# Patient Record
Sex: FEMALE | Race: WHITE | NOT HISPANIC OR LATINO | Employment: OTHER | URBAN - METROPOLITAN AREA
[De-identification: names, ages, dates, MRNs, and addresses within clinical notes are randomized per-mention and may not be internally consistent; named-entity substitution may affect disease eponyms.]

---

## 2020-01-14 PROBLEM — I65.23 OCCLUSION AND STENOSIS OF BILATERAL CAROTID ARTERIES: Status: ACTIVE | Noted: 2020-01-14

## 2020-01-14 PROBLEM — I77.819 DILATATION OF AORTA (HCC): Status: ACTIVE | Noted: 2020-01-14

## 2020-03-11 ENCOUNTER — HOSPITAL ENCOUNTER (INPATIENT)
Facility: MEDICAL CENTER | Age: 85
LOS: 2 days | DRG: 065 | End: 2020-03-13
Attending: EMERGENCY MEDICINE | Admitting: HOSPITALIST
Payer: MEDICARE

## 2020-03-11 ENCOUNTER — HOSPITAL ENCOUNTER (OUTPATIENT)
Dept: RADIOLOGY | Facility: MEDICAL CENTER | Age: 85
End: 2020-03-11
Payer: MEDICARE

## 2020-03-11 DIAGNOSIS — I63.10 CEREBROVASCULAR ACCIDENT (CVA) DUE TO EMBOLISM OF PRECEREBRAL ARTERY (HCC): ICD-10-CM

## 2020-03-11 DIAGNOSIS — N17.9 ACUTE KIDNEY INJURY (HCC): ICD-10-CM

## 2020-03-11 DIAGNOSIS — N39.0 ACUTE UTI: ICD-10-CM

## 2020-03-11 DIAGNOSIS — I63.9 ACUTE CVA (CEREBROVASCULAR ACCIDENT) (HCC): ICD-10-CM

## 2020-03-11 DIAGNOSIS — R09.02 HYPOXIA: ICD-10-CM

## 2020-03-11 DIAGNOSIS — I71.20 THORACIC AORTIC ANEURYSM WITHOUT RUPTURE (HCC): ICD-10-CM

## 2020-03-11 LAB
AMMONIA PLAS-SCNC: 37 UMOL/L (ref 11–45)
APTT PPP: 29.6 SEC (ref 24.7–36)
EST. AVERAGE GLUCOSE BLD GHB EST-MCNC: 192 MG/DL
HBA1C MFR BLD: 8.3 % (ref 0–5.6)
HIV 1+2 AB+HIV1 P24 AG SERPL QL IA: NORMAL
HIV 1+2 AB+HIV1 P24 AG SERPL QL IA: NORMAL
INR PPP: 1.09 (ref 0.87–1.13)
MAGNESIUM SERPL-MCNC: 1.5 MG/DL (ref 1.5–2.5)
PROTHROMBIN TIME: 14.3 SEC (ref 12–14.6)
TREPONEMA PALLIDUM IGG+IGM AB [PRESENCE] IN SERUM OR PLASMA BY IMMUNOASSAY: NORMAL
TROPONIN T SERPL-MCNC: 60 NG/L (ref 6–19)

## 2020-03-11 PROCEDURE — 770020 HCHG ROOM/CARE - TELE (206)

## 2020-03-11 PROCEDURE — 94760 N-INVAS EAR/PLS OXIMETRY 1: CPT

## 2020-03-11 PROCEDURE — 99222 1ST HOSP IP/OBS MODERATE 55: CPT | Performed by: NURSE PRACTITIONER

## 2020-03-11 PROCEDURE — 82552 ASSAY OF CPK IN BLOOD: CPT

## 2020-03-11 PROCEDURE — 84484 ASSAY OF TROPONIN QUANT: CPT | Mod: 91

## 2020-03-11 PROCEDURE — 84425 ASSAY OF VITAMIN B-1: CPT

## 2020-03-11 PROCEDURE — 99223 1ST HOSP IP/OBS HIGH 75: CPT | Performed by: HOSPITALIST

## 2020-03-11 PROCEDURE — 87389 HIV-1 AG W/HIV-1&-2 AB AG IA: CPT

## 2020-03-11 PROCEDURE — 82550 ASSAY OF CK (CPK): CPT

## 2020-03-11 PROCEDURE — 99285 EMERGENCY DEPT VISIT HI MDM: CPT

## 2020-03-11 PROCEDURE — 82140 ASSAY OF AMMONIA: CPT

## 2020-03-11 PROCEDURE — 84443 ASSAY THYROID STIM HORMONE: CPT

## 2020-03-11 PROCEDURE — G0475 HIV COMBINATION ASSAY: HCPCS

## 2020-03-11 PROCEDURE — 83036 HEMOGLOBIN GLYCOSYLATED A1C: CPT

## 2020-03-11 PROCEDURE — 700105 HCHG RX REV CODE 258: Performed by: HOSPITALIST

## 2020-03-11 PROCEDURE — 86780 TREPONEMA PALLIDUM: CPT

## 2020-03-11 PROCEDURE — 85610 PROTHROMBIN TIME: CPT

## 2020-03-11 PROCEDURE — 83735 ASSAY OF MAGNESIUM: CPT

## 2020-03-11 PROCEDURE — 82607 VITAMIN B-12: CPT

## 2020-03-11 PROCEDURE — 85730 THROMBOPLASTIN TIME PARTIAL: CPT

## 2020-03-11 RX ORDER — POLYETHYLENE GLYCOL 3350 17 G/17G
1 POWDER, FOR SOLUTION ORAL
Status: DISCONTINUED | OUTPATIENT
Start: 2020-03-11 | End: 2020-03-13 | Stop reason: HOSPADM

## 2020-03-11 RX ORDER — OXYCODONE HYDROCHLORIDE 5 MG/1
2.5 TABLET ORAL
Status: DISCONTINUED | OUTPATIENT
Start: 2020-03-11 | End: 2020-03-13 | Stop reason: HOSPADM

## 2020-03-11 RX ORDER — LABETALOL HYDROCHLORIDE 5 MG/ML
10 INJECTION, SOLUTION INTRAVENOUS EVERY 4 HOURS PRN
Status: DISCONTINUED | OUTPATIENT
Start: 2020-03-11 | End: 2020-03-13 | Stop reason: HOSPADM

## 2020-03-11 RX ORDER — CLOPIDOGREL BISULFATE 75 MG/1
75 TABLET ORAL EVERY MORNING
Status: DISCONTINUED | OUTPATIENT
Start: 2020-03-11 | End: 2020-03-13 | Stop reason: HOSPADM

## 2020-03-11 RX ORDER — GLIMEPIRIDE 4 MG/1
4 TABLET ORAL 2 TIMES DAILY
COMMUNITY
End: 2020-05-22 | Stop reason: SDUPTHER

## 2020-03-11 RX ORDER — BISACODYL 10 MG
10 SUPPOSITORY, RECTAL RECTAL
Status: DISCONTINUED | OUTPATIENT
Start: 2020-03-11 | End: 2020-03-13 | Stop reason: HOSPADM

## 2020-03-11 RX ORDER — ASPIRIN 81 MG/1
324 TABLET, CHEWABLE ORAL DAILY
Status: DISCONTINUED | OUTPATIENT
Start: 2020-03-11 | End: 2020-03-11

## 2020-03-11 RX ORDER — OXYCODONE HYDROCHLORIDE 5 MG/1
5 TABLET ORAL
Status: DISCONTINUED | OUTPATIENT
Start: 2020-03-11 | End: 2020-03-13 | Stop reason: HOSPADM

## 2020-03-11 RX ORDER — HYDRALAZINE HYDROCHLORIDE 20 MG/ML
10 INJECTION INTRAMUSCULAR; INTRAVENOUS
Status: DISCONTINUED | OUTPATIENT
Start: 2020-03-11 | End: 2020-03-13 | Stop reason: HOSPADM

## 2020-03-11 RX ORDER — ONDANSETRON 4 MG/1
4 TABLET, ORALLY DISINTEGRATING ORAL EVERY 4 HOURS PRN
Status: DISCONTINUED | OUTPATIENT
Start: 2020-03-11 | End: 2020-03-13 | Stop reason: HOSPADM

## 2020-03-11 RX ORDER — ATORVASTATIN CALCIUM 20 MG/1
80 TABLET, FILM COATED ORAL EVERY MORNING
Status: DISCONTINUED | OUTPATIENT
Start: 2020-03-11 | End: 2020-03-13 | Stop reason: HOSPADM

## 2020-03-11 RX ORDER — ASPIRIN 300 MG/1
300 SUPPOSITORY RECTAL DAILY
Status: DISCONTINUED | OUTPATIENT
Start: 2020-03-11 | End: 2020-03-11

## 2020-03-11 RX ORDER — ACETAMINOPHEN 325 MG/1
650 TABLET ORAL EVERY 6 HOURS PRN
Status: DISCONTINUED | OUTPATIENT
Start: 2020-03-11 | End: 2020-03-13 | Stop reason: HOSPADM

## 2020-03-11 RX ORDER — ASPIRIN 325 MG
325 TABLET ORAL DAILY
Status: DISCONTINUED | OUTPATIENT
Start: 2020-03-11 | End: 2020-03-11

## 2020-03-11 RX ORDER — DEXTROSE AND SODIUM CHLORIDE 5; .45 G/100ML; G/100ML
INJECTION, SOLUTION INTRAVENOUS CONTINUOUS
Status: DISCONTINUED | OUTPATIENT
Start: 2020-03-11 | End: 2020-03-12

## 2020-03-11 RX ORDER — ATORVASTATIN CALCIUM 20 MG/1
20 TABLET, FILM COATED ORAL EVERY MORNING
Status: ON HOLD | COMMUNITY
End: 2020-03-13

## 2020-03-11 RX ORDER — PROPRANOLOL HYDROCHLORIDE 80 MG/1
80 TABLET ORAL 2 TIMES DAILY
COMMUNITY
End: 2020-09-09 | Stop reason: SDUPTHER

## 2020-03-11 RX ORDER — MORPHINE SULFATE 4 MG/ML
2 INJECTION, SOLUTION INTRAMUSCULAR; INTRAVENOUS
Status: DISCONTINUED | OUTPATIENT
Start: 2020-03-11 | End: 2020-03-13 | Stop reason: HOSPADM

## 2020-03-11 RX ORDER — OMEPRAZOLE 20 MG/1
20 CAPSULE, DELAYED RELEASE ORAL DAILY
Status: DISCONTINUED | OUTPATIENT
Start: 2020-03-11 | End: 2020-03-13 | Stop reason: HOSPADM

## 2020-03-11 RX ORDER — AMOXICILLIN 250 MG
2 CAPSULE ORAL 2 TIMES DAILY
Status: DISCONTINUED | OUTPATIENT
Start: 2020-03-11 | End: 2020-03-13 | Stop reason: HOSPADM

## 2020-03-11 RX ORDER — INSULIN GLARGINE 100 [IU]/ML
10 INJECTION, SOLUTION SUBCUTANEOUS EVERY EVENING
COMMUNITY
End: 2020-09-09 | Stop reason: SDUPTHER

## 2020-03-11 RX ORDER — CLOPIDOGREL BISULFATE 75 MG/1
75 TABLET ORAL EVERY MORNING
COMMUNITY
End: 2020-07-31

## 2020-03-11 RX ORDER — ENALAPRIL MALEATE 10 MG/1
10 TABLET ORAL 2 TIMES DAILY
COMMUNITY
End: 2020-05-08

## 2020-03-11 RX ORDER — ONDANSETRON 2 MG/ML
4 INJECTION INTRAMUSCULAR; INTRAVENOUS EVERY 4 HOURS PRN
Status: DISCONTINUED | OUTPATIENT
Start: 2020-03-11 | End: 2020-03-13 | Stop reason: HOSPADM

## 2020-03-11 RX ADMIN — DEXTROSE AND SODIUM CHLORIDE: 5; 450 INJECTION, SOLUTION INTRAVENOUS at 21:08

## 2020-03-11 ASSESSMENT — ENCOUNTER SYMPTOMS
VOMITING: 0
HEARTBURN: 0
DOUBLE VISION: 0
EYES NEGATIVE: 1
CHILLS: 0
FOCAL WEAKNESS: 0
HEMOPTYSIS: 0
DIARRHEA: 0
MUSCULOSKELETAL NEGATIVE: 1
WEAKNESS: 1
BLOOD IN STOOL: 0
GASTROINTESTINAL NEGATIVE: 1
MEMORY LOSS: 1
LOSS OF CONSCIOUSNESS: 0
FEVER: 0
CARDIOVASCULAR NEGATIVE: 1
CONSTITUTIONAL NEGATIVE: 1
PALPITATIONS: 0
SEIZURES: 0
HEADACHES: 0
NERVOUS/ANXIOUS: 0
SENSORY CHANGE: 1
DIZZINESS: 0
ABDOMINAL PAIN: 0
CONSTIPATION: 0
BRUISES/BLEEDS EASILY: 0
COUGH: 0
NAUSEA: 0
DIAPHORESIS: 0
WHEEZING: 0
RESPIRATORY NEGATIVE: 1

## 2020-03-11 ASSESSMENT — COPD QUESTIONNAIRES
DO YOU EVER COUGH UP ANY MUCUS OR PHLEGM?: NO/ONLY WITH OCCASIONAL COLDS OR INFECTIONS
DURING THE PAST 4 WEEKS HOW MUCH DID YOU FEEL SHORT OF BREATH: NONE/LITTLE OF THE TIME
COPD SCREENING SCORE: 4
HAVE YOU SMOKED AT LEAST 100 CIGARETTES IN YOUR ENTIRE LIFE: YES

## 2020-03-11 ASSESSMENT — FIBROSIS 4 INDEX: FIB4 SCORE: 2.22

## 2020-03-11 ASSESSMENT — PATIENT HEALTH QUESTIONNAIRE - PHQ9
1. LITTLE INTEREST OR PLEASURE IN DOING THINGS: NOT AT ALL
SUM OF ALL RESPONSES TO PHQ9 QUESTIONS 1 AND 2: 0
2. FEELING DOWN, DEPRESSED, IRRITABLE, OR HOPELESS: NOT AT ALL

## 2020-03-11 ASSESSMENT — LIFESTYLE VARIABLES: EVER_SMOKED: YES

## 2020-03-11 NOTE — ED PROVIDER NOTES
ED Provider Note    Scribed for Charity Glover M.D. by Gold Avendano. 3/11/2020, 4:18 PM.    Primary care provider: Robert Arteaga M.D.  Means of arrival: Transfer  History obtained from: Patient and Transfer Documents  History limited by: None    CHIEF COMPLAINT  Chief Complaint   Patient presents with   • Possible Stroke     Seen at OSH, + CVA on MRI, improving S/Sx       HPI  Darleen Verdugo is a 85 y.o. female who presents to the Emergency Department as a [place] [facility] [place] evaluation of a possible stroke. She reported to the facility via EMS for evaluation of altered mental status after her significant other was unable to arouse her in the morning. He noted that she went to bed at night appearing normal and oriented. En route to the ED the patient became more aware and was A&Ox3 but drowsy upon arrival to the er. She has a history of diabetes and EMS reports a sugar of 170 en route. Hypoxic at that time so placed on supplemental oxygen. The patient is currently being treated for a UTI, she was given Bactrim. UA at [facility] revealed evidence of the UTI so was given 2g of Rocephin. Additional to this the Urine drug screen was returned positive for Benzodiazepines, but has no history of taking these medications. CT completed ruling out PE, but showed an enlargement of a known thoracic aneurism from 4.2 to 5.9. MRI completed showing multifactorial acute infarcts that are thought to be secondary to an embolic phenomenon. Dr. Ross from Neuro was contacted and he suspects cardioembolic stroke possibly from A fib, but no A fib in ED. Her creatinine was 2.1. Transferred because no neuro at [facility].      The patient states that she does not remember what happened today, but denies confusion. Denies having a headache, numbness or weakness in her extremities, chest pain, fevers or abdominal pain. The patient denies history of lung problems or cardiac problems. The patient remembers  "that EMS transferred her to the hospital. States that she is currently taking blood thinners but is unsure what medication.     REVIEW OF SYSTEMS  Pertinent positives include confusion(resolved) and drowsyness. Pertinent negatives include no headache or numbness/weakness in extremities, chest pain, fever, abdominal pain. All other systems negative.    PAST MEDICAL HISTORY   has a past medical history of Aortic aneurysm, thoracic ([facility]) (10/15/2012), Arthritis, Carotid stenosis, right (10/15/2012), CATARACT, COPD (chronic obstructive pulmonary disease) ([facility]) (10/15/2012), Diabetes, Diverticulosis (10/15/2012), Eczema, Essential tremor (10/15/2012), GERD (gastroesophageal reflux disease), Glaucoma, H/O hyperkalemia (10/15/2012), Hyperlipidemia, Hypertension, Nocturnal hypoxia (10/15/2012), Rosacea, Stroke ([facility]), and Vertigo.    SURGICAL HISTORY   has a past surgical history that includes rotator cuff repair; foot surgery; abdominal hysterectomy total; and cataract extraction with iol.    SOCIAL HISTORY  Social History     Tobacco Use   • Smoking status: Former Smoker     Packs/day: 0.75     Years: 50.00     Pack years: 37.50     Types: Cigarettes     Last attempt to quit: 10/20/2003     Years since quittin.4   • Smokeless tobacco: Never Used   Substance Use Topics   • Alcohol use: Yes     Comment: rarely   • Drug use: No      Social History     Substance and Sexual Activity   Drug Use No       FAMILY HISTORY  Family History   Problem Relation Age of Onset   • Hypertension Mother    • Cancer Mother         lymphoma   • Cancer Father         lung   • Cancer Sister    • Heart Disease Son        CURRENT MEDICATIONS  Home Medications    **Home medications have not yet been reviewed for this encounter**         ALLERGIES  No Known Allergies    PHYSICAL EXAM  VITAL SIGNS: /77   Pulse 66   Temp 36.8 °C (98.2 °F) (Temporal)   Resp 20   Ht 1.626 m (5' 4\")   Wt 80.2 kg (176 lb 11.2 oz)   SpO2 (!) 87%   BMI 30.33 " kg/m²     Constitutional: Hard of hearing so difficult to tell if she is slow answering questions due to that or another cause, Well developed, No acute distress, Non-toxic appearance.   HENT: Normocephalic, Atraumatic, Bilateral external ears normal, Oropharynx mois  Eyes: PERRL, EOMI, Conjunctiva normal  Neck: Normal range of motion, No tenderness, Supple  Cardiovascular: Normal heart rate, Normal rhythm,   Thorax & Lungs: Normal breath sounds, No respiratory distress,   Abdomen: Benign abdominal exam, no guarding no rebound, no tenderness, no distention  Skin: Warm, Dry, No erythema, No rash.   Back: No tenderness, No CVA tenderness.   Extremities: Intact distal pulses, No edema, No tenderness   Neurologic: Alert & oriented x 3. Cranial nerves II through XII intact. No facial asymmetry. No slurred speech, No aphasia, Good strength bilaterally. No pronator drift.  +5 strength in upper and lower extremities and intact sensation to light touch throughout.  Psychiatric: Appropriate                                                     DIAGNOSTIC STUDIES / PROCEDURES      COURSE & MEDICAL DECISION MAKING  Nursing notes, VS, PMSFHx reviewed in chart.     4:08 PM - Discussed the patient's case with the transfer provider.     4:10 PM - I have reviewed the patient's transfer documents.     4:18 PM Patient seen and examined at bedside. We discussed that the patient is to be hospitalized so she can be further evaluated by neurology. She is understanding and consenting.  Patient will require monitoring to evaluate for possible thromboembolic cause for her strokes.  Patient has received antibiotics for her urinary tract infection.  She is not having any active chest pain and there is no evidence of dissection on CT scan.  Patient is hypoxic and is unclear what the etiology is.  She did have benzodiazepines in her system but no clear history of taking benzodiazepines.  However this could be the cause of the altered mental status  and her hypoxia.  Patient is awake and alert here.  She does not have any current complaints.  The onset of these strokes is unknown and therefore she is not a candidate for TPA.    4:28 PM - I spoke to Dr. Niño (hospitalist) regarding the patient’s pertinent abnormalities. Dr. Niño agrees to evaluate the patient for hospitalization. He would not like repeated labs at this time.       DISPOSITION:  Patient will be hospitalized by Dr. Niño in guarded condition.     FINAL IMPRESSION  1. Acute CVA (cerebrovascular accident) (HCC)    2. Acute UTI    3. Hypoxia    4. Thoracic aortic aneurysm without rupture (HCC)    5. Acute kidney injury (HCC)          IGold (Scribe), am scribing for, and in the presence of, Charity Glover M.D..    Electronically signed by: Gold Avendano (Scribe), 3/11/2020    ICharity M.D. personally performed the services described in this documentation, as scribed by Gold Avendano in my presence, and it is both accurate and complete. C    The note accurately reflects work and decisions made by me.  Charity Glover M.D.  3/11/2020  5:09 PM

## 2020-03-11 NOTE — ED NOTES
Pharmacy Medication Reconciliation    Med rec updated and complete per pt at bedside & pt home pharmacy  Allergies have been verified   Pt home pharmacy:[facility]      Pt home pharmacy reports that pt is on a 7 day course of BACTRIM that was started on 02/10/2020

## 2020-03-11 NOTE — ED TRIAGE NOTES
Chief Complaint   Patient presents with   • Possible Stroke     Seen at OSH, + CVA on MRI, improving S/Sx     Patient was brought to [facility] ER for Further CVA work up. Per [facility], patient had a significnat episode of AMS this morning, was last known normal at 0800 this morning. For the Initial Crew patient was unable to participate in Neuro exam because of the AMS. Patient mentation has improved to the point that she is A&Ox 4, but had an MRI that was + for CVA

## 2020-03-12 ENCOUNTER — APPOINTMENT (OUTPATIENT)
Dept: CARDIOLOGY | Facility: MEDICAL CENTER | Age: 85
DRG: 065 | End: 2020-03-12
Attending: HOSPITALIST
Payer: MEDICARE

## 2020-03-12 LAB
CHOLEST SERPL-MCNC: 147 MG/DL (ref 100–199)
EKG IMPRESSION: NORMAL
GLUCOSE BLD-MCNC: 120 MG/DL (ref 65–99)
GLUCOSE BLD-MCNC: 130 MG/DL (ref 65–99)
GLUCOSE BLD-MCNC: 144 MG/DL (ref 65–99)
HDLC SERPL-MCNC: 35 MG/DL
LDLC SERPL CALC-MCNC: 80 MG/DL
TRIGL SERPL-MCNC: 158 MG/DL (ref 0–149)
TSH SERPL DL<=0.005 MIU/L-ACNC: 1.32 UIU/ML (ref 0.38–5.33)
VIT B12 SERPL-MCNC: 270 PG/ML (ref 211–911)

## 2020-03-12 PROCEDURE — 770020 HCHG ROOM/CARE - TELE (206)

## 2020-03-12 PROCEDURE — 93005 ELECTROCARDIOGRAM TRACING: CPT | Performed by: INTERNAL MEDICINE

## 2020-03-12 PROCEDURE — 99233 SBSQ HOSP IP/OBS HIGH 50: CPT | Performed by: INTERNAL MEDICINE

## 2020-03-12 PROCEDURE — 97166 OT EVAL MOD COMPLEX 45 MIN: CPT

## 2020-03-12 PROCEDURE — 700102 HCHG RX REV CODE 250 W/ 637 OVERRIDE(OP): Performed by: HOSPITALIST

## 2020-03-12 PROCEDURE — 92523 SPEECH SOUND LANG COMPREHEN: CPT

## 2020-03-12 PROCEDURE — 80061 LIPID PANEL: CPT

## 2020-03-12 PROCEDURE — 93010 ELECTROCARDIOGRAM REPORT: CPT | Performed by: INTERNAL MEDICINE

## 2020-03-12 PROCEDURE — 97161 PT EVAL LOW COMPLEX 20 MIN: CPT

## 2020-03-12 PROCEDURE — 99232 SBSQ HOSP IP/OBS MODERATE 35: CPT | Performed by: NURSE PRACTITIONER

## 2020-03-12 PROCEDURE — 82962 GLUCOSE BLOOD TEST: CPT | Mod: 91

## 2020-03-12 PROCEDURE — 36415 COLL VENOUS BLD VENIPUNCTURE: CPT

## 2020-03-12 PROCEDURE — A9270 NON-COVERED ITEM OR SERVICE: HCPCS | Performed by: HOSPITALIST

## 2020-03-12 RX ADMIN — SENNOSIDES AND DOCUSATE SODIUM 2 TABLET: 8.6; 5 TABLET ORAL at 09:29

## 2020-03-12 RX ADMIN — ATORVASTATIN CALCIUM 80 MG: 20 TABLET, FILM COATED ORAL at 09:29

## 2020-03-12 RX ADMIN — SENNOSIDES AND DOCUSATE SODIUM 2 TABLET: 8.6; 5 TABLET ORAL at 17:19

## 2020-03-12 RX ADMIN — CLOPIDOGREL BISULFATE 75 MG: 75 TABLET ORAL at 09:29

## 2020-03-12 RX ADMIN — OMEPRAZOLE 20 MG: 20 CAPSULE, DELAYED RELEASE ORAL at 09:29

## 2020-03-12 ASSESSMENT — ENCOUNTER SYMPTOMS
HEADACHES: 0
HEARTBURN: 0
PALPITATIONS: 0
VOMITING: 0
SHORTNESS OF BREATH: 0
FEVER: 0
SORE THROAT: 0
DEPRESSION: 0
NAUSEA: 0
BLOOD IN STOOL: 0
CONSTIPATION: 0
ABDOMINAL PAIN: 0
FOCAL WEAKNESS: 0
CHILLS: 0
DIZZINESS: 0
MYALGIAS: 0
DIARRHEA: 0
BLURRED VISION: 0
WEAKNESS: 0

## 2020-03-12 ASSESSMENT — COGNITIVE AND FUNCTIONAL STATUS - GENERAL
CLIMB 3 TO 5 STEPS WITH RAILING: A LITTLE
STANDING UP FROM CHAIR USING ARMS: A LITTLE
DRESSING REGULAR UPPER BODY CLOTHING: A LITTLE
MOVING TO AND FROM BED TO CHAIR: A LITTLE
EATING MEALS: A LITTLE
DAILY ACTIVITIY SCORE: 18
MOVING FROM LYING ON BACK TO SITTING ON SIDE OF FLAT BED: A LITTLE
TOILETING: A LITTLE
HELP NEEDED FOR BATHING: A LITTLE
DRESSING REGULAR LOWER BODY CLOTHING: A LITTLE
MOBILITY SCORE: 19
WALKING IN HOSPITAL ROOM: A LITTLE
PERSONAL GROOMING: A LITTLE
SUGGESTED CMS G CODE MODIFIER MOBILITY: CK
SUGGESTED CMS G CODE MODIFIER DAILY ACTIVITY: CK

## 2020-03-12 ASSESSMENT — GAIT ASSESSMENTS
DISTANCE (FEET): 200
GAIT LEVEL OF ASSIST: STAND BY ASSIST

## 2020-03-12 ASSESSMENT — ACTIVITIES OF DAILY LIVING (ADL): TOILETING: INDEPENDENT

## 2020-03-12 NOTE — THERAPY
"Speech Language Therapy Evaluation completed to address speech and cognition  Functional Status:  Speech Language and Cognitive evaluation completed using the bedside WAB and Cognistat. She had minimal deficits in orientation and recall with moderate to severe deficits in visual scanning and reading. Initially, she was naming pictures incorrectly \"it looks like a cushion\" then cued to scan/look all the way to the left and she realized \"oh, it's a shoe.\" This was apparent for any picture, written word or sentence that crossed midline to the left of the page. She recalled 2/4 items in 10 minutes but 4/4 when given cues. Her auditory processing was delayed but after 20-30 seconds she would repeat a 6 digit sequence or sentence with 100% accuracy. She reports that the visual deficit regarding not seeing the left side of the page is new. She also wears reading glasses bus does not have them here and declined to try any of the pairs that we have. She reports she had right sided visual difficulties with her previous stroke but this improved. Please remind patient to scan and look to the left when she is trying to find items in her room  Recommendations:  She will likely need assistance with ADLs/IADLs like meal prep, medication management given her visual deficits. Defer to OT/PT for functional assessment and safety of discharge.   Plan of Care: Will benefit from Speech Therapy 3 times per week while in the hospital.  Post-Acute Therapy: Recommend outpatient or home health transitional care services for continued speech therapy services      See \"Rehab Therapy-Acute\" Patient Summary Report for complete documentation.   "

## 2020-03-12 NOTE — PROGRESS NOTES
HOSPITAL MEDICINE PROGRESS NOTE    Date of service  3/12/2020    Chief Complaint  Possible Stroke (Seen at OSH, + CVA on MRI, improving S/Sx)      Hospital Course:     85-year-old woman who presented to the hospital on transfer from an outside facility for possible stroke.  MRI of the brain here show multiple lesions acutely in different territory, suspicious for embolic phenomenon from a cardiac source, most likely secondary to alcohol atrial fibrillation, though EKG and telemetry have shown persistent normal sinus rhythm.           Interval History Updates:  No event overnight.  Afebrile.      Consultants/Specialty  Neurology    Review of Systems   Review of Systems   Constitutional: Negative for chills and fever.   HENT: Negative for ear pain and sore throat.    Eyes: Negative for blurred vision.   Respiratory: Negative for shortness of breath.    Cardiovascular: Negative for chest pain, palpitations and leg swelling.   Gastrointestinal: Negative for abdominal pain, blood in stool, constipation, diarrhea, heartburn, melena, nausea and vomiting.   Genitourinary: Negative for dysuria, hematuria and urgency.   Musculoskeletal: Negative for myalgias.   Skin: Negative for rash.   Neurological: Negative for dizziness, focal weakness, weakness and headaches.   Endo/Heme/Allergies: Negative.    Psychiatric/Behavioral: Negative for depression.   All other systems reviewed and are negative.       Medications:  • atorvastatin  80 mg QAM   • clopidogrel  75 mg QAM   • omeprazole  20 mg DAILY   • acetaminophen  650 mg Q6HRS PRN   • ondansetron  4 mg Q4HRS PRN   • ondansetron  4 mg Q4HRS PRN   • senna-docusate  2 Tab BID    And   • polyethylene glycol/lytes  1 Packet QDAY PRN    And   • magnesium hydroxide  30 mL QDAY PRN    And   • bisacodyl  10 mg QDAY PRN   • Pharmacy   PHARMACY TO DOSE   • Respiratory Therapy Consult   Continuous RT   • Pharmacy Consult Request  1 Each PHARMACY TO DOSE    And   • oxyCODONE  immediate-release  2.5 mg Q3HRS PRN    And   • oxyCODONE immediate-release  5 mg Q3HRS PRN    And   • morphine injection  2 mg Q3HRS PRN   • labetalol  10 mg Q4HRS PRN    Or   • hydrALAZINE  10 mg Q2HRS PRN       Physical Exam   Vitals:    03/11/20 2116 03/12/20 0000 03/12/20 0400 03/12/20 0800   BP:  112/48 101/81 125/75   Pulse: 73 64 91 97   Resp: 16 16 18 16   Temp:  36.7 °C (98.1 °F) 37 °C (98.6 °F) 36.4 °C (97.6 °F)   TempSrc:  Temporal Temporal Temporal   SpO2: 97% 98% 95% 98%   Weight:       Height:           Physical Exam   Constitutional: She is oriented to person, place, and time and well-developed, well-nourished, and in no distress. No distress.   HENT:   Head: Normocephalic and atraumatic.   Eyes: Pupils are equal, round, and reactive to light. Conjunctivae are normal. No scleral icterus.   Neck: Normal range of motion. Neck supple.   Cardiovascular: Normal rate, regular rhythm and intact distal pulses. Exam reveals no gallop and no friction rub.   No murmur heard.  Pulmonary/Chest: Effort normal and breath sounds normal. No respiratory distress. She has no wheezes. She has no rales. She exhibits no tenderness.   Abdominal: Soft. Bowel sounds are normal. She exhibits no distension and no mass. There is no abdominal tenderness. There is no rebound and no guarding.   Musculoskeletal: Normal range of motion.         General: No tenderness or edema.   Neurological: She is alert and oriented to person, place, and time.   Skin: Skin is warm and dry. She is not diaphoretic.   Psychiatric: Mood and affect normal.   Nursing note and vitals reviewed.         Laboratory   Recent Labs     03/11/20  0950   WBC 7.7   RBC 3.80*   HEMOGLOBIN 11.8*   HEMATOCRIT 35.6*   PLATELETCT 166     Recent Labs     03/11/20  0950   SODIUM 138   POTASSIUM 4.3   CHLORIDE 104   CO2 22   GLUCOSE 133*   BUN 37*   CREATININE 2.1*      Recent Labs     03/11/20  0950   ALTSGPT 12   ASTSGOT 15   ALKPHOSPHAT 71   TBILIRUBIN 0.6   LIPASE 203    GLUCOSE 133*      Recent Labs     03/11/20  1605   APTT 29.6   INR 1.09           Microbiology  Results     ** No results found for the last 168 hours. **             Labs reviewed by me and noted above.    Radiology  MR-BRAIN-W/O  Impression: Multifocal acute infarcts as above. May be secondary to embolic phenomenon versus represent watershed infarcts.    Moderate to severe chronic small vessel ischemic changes with old left occipital lobe and left basal ganglial infarcts.    CT-HEAD W/O  Impression: Degenerative changes are present, along with a remote infarction involving the left occipital lobe.  However, I do not appreciate any evidence of an acute brain injury, or brain hemorrhage.    EKG performed 3/12/20 was personally reviewed and per my interpretation shows NSR, rate 74.  No ischemia.    CXR 3/11/20 personally reviewed and per my interpretation shows no obvious infiltrate or consolidation.      Assessment and Plan      Principal Problem:    Cerebral infarction (HCC) POA: Yes      Overview: Diagnois update 10/1/2016  Active Problems:    Carotid stenosis, right (Chronic) POA: Yes    HTN (hypertension) (Chronic) POA: Yes    T2DM (type 2 diabetes mellitus) (HCC) POA: Yes    Hyperlipidemia (Chronic) POA: Yes    GERD (gastroesophageal reflux disease) (Chronic) POA: Yes  Resolved Problems:    * No resolved hospital problems. *      I order an EKG to document her rhythm.  My interpretation is above.  EKG yesterday not available for review for unknown reason.  Cont Lipitor 80mg, Plavix 75mg.  No indication for ASA per Neuro.  Permissive HTN  Obtain Echo  PT and OT  Pt passed her SLP eval, so ordered diet      DVT prophylaxis: SCD    CODE STATUS:  FULL CODE    Disposition: Anticipate Home in 1-2 days.    Case was discussed with Primary RN, Charge Nurse, Medical SW, , and Pharmacist on IDTround and Pharmacist in addition to individual(s) mentioned above.    I have performed a physical exam and reviewed  and updated ROS as of today, 3/12/2020.  In review of yesterday's note dated, 3/11/2020, there are no changes except as documented above.      Wendi Boyd M.D.

## 2020-03-12 NOTE — H&P
Hospital Medicine History & Physical Note    Date of Service  3/11/2020    Primary Care Physician  Robert Arteaga M.D.    Consultants  Neurology    Code Status  Full code    Chief Complaint  Altered mental status    History of Presenting Illness  85 y.o. female who presented 3/11/2020 with altered mental status that came on this morning.  Her  called the paramedics and she was brought into the [place] emergency room.  The MRI was done which shows multiple embolic strokes.  The patient was sent up to [facility] for higher level of care.  Patient surprisingly is alert awake oriented x3.  She knows place time people around her.  She is looking for her kids though which she was supposed to see today.  She does not remember the events of this morning.  She does have some right-sided deficits that have been there for apparently 11 years and she is lived with those for a while.  Patient's work-up will need to be completed with an echocardiogram.  She will be placed on Plavix aspirin and statin.  The patient will need speech evaluation for swallowing.  She will need at this point PT OT evaluation as well.  She is outside of the window for any TPA.    Review of Systems  Review of Systems   Constitutional: Negative.  Negative for chills, diaphoresis and fever.   HENT: Negative.    Eyes: Negative.  Negative for double vision.   Respiratory: Negative.  Negative for cough, hemoptysis and wheezing.    Cardiovascular: Negative.  Negative for chest pain, palpitations and leg swelling.   Gastrointestinal: Negative.  Negative for abdominal pain, blood in stool, constipation, diarrhea, heartburn, nausea and vomiting.   Genitourinary: Negative.  Negative for frequency, hematuria and urgency.   Musculoskeletal: Negative.  Negative for joint pain.   Skin: Negative.  Negative for itching and rash.   Neurological: Positive for sensory change and weakness. Negative for dizziness, focal weakness, seizures, loss of consciousness  and headaches.   Endo/Heme/Allergies: Negative.  Does not bruise/bleed easily.   Psychiatric/Behavioral: Positive for memory loss. Negative for suicidal ideas. The patient is not nervous/anxious.    All other systems reviewed and are negative.      Past Medical History   has a past medical history of Aortic aneurysm, thoracic ([facility]) (10/15/2012), Arthritis, Carotid stenosis, right (10/15/2012), CATARACT, COPD (chronic obstructive pulmonary disease) ([facility]) (10/15/2012), Diabetes, Diverticulosis (10/15/2012), Eczema, Essential tremor (10/15/2012), GERD (gastroesophageal reflux disease), Glaucoma, H/O hyperkalemia (10/15/2012), Hyperlipidemia, Hypertension, Nocturnal hypoxia (10/15/2012), Rosacea, Stroke ([facility]), and Vertigo.    Surgical History   has a past surgical history that includes rotator cuff repair; foot surgery; abdominal hysterectomy total; and cataract extraction with iol.     Family History  family history includes Cancer in her father, mother, and sister; Heart Disease in her son; Hypertension in her mother.     Social History   reports that she quit smoking about 16 years ago. Her smoking use included cigarettes. She has a 37.50 pack-year smoking history. She has never used smokeless tobacco. She reports current alcohol use. She reports that she does not use drugs.    Allergies  No Known Allergies    Medications  Prior to Admission Medications   Prescriptions Last Dose Informant Patient Reported? Taking?   CRANBERRY PO 3/10/2020 at am Patient Yes No   Sig: Take 1 Tab by mouth every morning.   Cholecalciferol (VITAMIN D PO) 3/10/2020 at am Patient Yes No   Sig: Take 1 Tab by mouth every morning.   Cyanocobalamin (VITAMIN B-12 PO) 3/10/2020 at am Patient Yes Yes   Sig: Take 1 Tab by mouth every morning.   atorvastatin (LIPITOR) 20 MG Tab 3/10/2020 at am Patient's Home Pharmacy Yes Yes   Sig: Take 20 mg by mouth every morning.   clopidogrel (PLAVIX) 75 MG Tab 3/10/2020 at am Patient's Home Pharmacy Yes Yes    Sig: Take 75 mg by mouth every morning.   enalapril (VASOTEC) 10 MG Tab 3/10/2020 at pm Patient's Home Pharmacy Yes Yes   Sig: Take 10 mg by mouth 2 Times a Day.   glimepiride (AMARYL) 4 MG Tab 3/10/2020 at pm Patient's Home Pharmacy Yes Yes   Sig: Take 4 mg by mouth 2 Times a Day.   insulin glargine (LANTUS) 100 UNIT/ML Solution 3/10/2020 at pm Patient's Home Pharmacy Yes Yes   Sig: Inject 10 Units as instructed every evening.   metformin (GLUCOPHAGE) 1000 MG tablet 3/10/2020 at pm Patient's Home Pharmacy Yes Yes   Sig: Take 1,000 mg by mouth 2 Times a Day.   omeprazole (PRILOSEC) 20 MG delayed-release capsule 3/10/2020 at am Patient's Home Pharmacy Yes No   Sig: Take 20 mg by mouth every day.   propranolol (INDERAL) 80 MG Tab 3/10/2020 at pm Patient's Home Pharmacy Yes Yes   Sig: Take 80 mg by mouth 2 Times a Day.   sulfamethoxazole-trimethoprim (BACTRIM) 400-80 MG Tab 3/10/2020 at pm Patient's Home Pharmacy Yes No   Sig: Take 1 Tab by mouth 2 times a day. 7 day course      Facility-Administered Medications: None       Physical Exam  Temp:  [36.8 °C (98.2 °F)] 36.8 °C (98.2 °F)  Pulse:  [66-83] 83  Resp:  [15-20] 15  BP: ()/(47-77) 114/47  SpO2:  [87 %-100 %] 100 %    Physical Exam  Vitals signs and nursing note reviewed. Exam conducted with a chaperone present.   Constitutional:       Appearance: Normal appearance. She is well-developed. She is obese. She is ill-appearing.   HENT:      Head: Normocephalic and atraumatic.      Right Ear: External ear normal.      Left Ear: External ear normal.      Nose: Nose normal.      Mouth/Throat:      Mouth: Mucous membranes are dry.      Pharynx: Oropharynx is clear.   Eyes:      Extraocular Movements: Extraocular movements intact.      Conjunctiva/sclera: Conjunctivae normal.      Pupils: Pupils are equal, round, and reactive to light.   Neck:      Musculoskeletal: Normal range of motion and neck supple.      Thyroid: No thyromegaly.      Vascular: No JVD.    Cardiovascular:      Rate and Rhythm: Tachycardia present. Rhythm irregular.      Pulses: Normal pulses.      Heart sounds: Normal heart sounds. No murmur.   Pulmonary:      Effort: Pulmonary effort is normal.      Breath sounds: Normal breath sounds.   Chest:      Chest wall: No tenderness.   Abdominal:      General: Abdomen is flat. There is no distension.      Palpations: Abdomen is soft. There is no mass.      Tenderness: There is no abdominal tenderness. There is no guarding or rebound.   Musculoskeletal: Normal range of motion.   Lymphadenopathy:      Cervical: No cervical adenopathy.   Skin:     General: Skin is warm and dry.      Capillary Refill: Capillary refill takes 2 to 3 seconds.      Findings: No rash.   Neurological:      Mental Status: She is alert and oriented to person, place, and time. Mental status is at baseline.      GCS: GCS eye subscore is 4. GCS verbal subscore is 5. GCS motor subscore is 4.      Cranial Nerves: Cranial nerves are intact. No cranial nerve deficit.      Sensory: Sensation is intact.      Motor: Weakness, tremor and abnormal muscle tone present. No atrophy, seizure activity or pronator drift.      Deep Tendon Reflexes: Reflexes are normal and symmetric.      Comments: Right sided deficit from 11 years ago   Psychiatric:         Attention and Perception: She is inattentive.         Mood and Affect: Mood is depressed. Affect is flat.         Speech: Speech is delayed.         Behavior: Behavior is slowed.         Thought Content: Thought content normal.         Cognition and Memory: Cognition normal.         Judgment: Judgment normal.         Laboratory:  Recent Labs     03/11/20  0950   WBC 7.7   RBC 3.80*   HEMOGLOBIN 11.8*   HEMATOCRIT 35.6*   MCV 93.7   MCH 31.1*   MCHC 33.1   RDW 13.2   PLATELETCT 166   MPV 10.1     Recent Labs     03/11/20  0950   SODIUM 138   POTASSIUM 4.3   CHLORIDE 104   CO2 22   GLUCOSE 133*   BUN 37*   CREATININE 2.1*   CALCIUM 8.6     Recent Labs      03/11/20  0950   ALTSGPT 12   ASTSGOT 15   ALKPHOSPHAT 71   TBILIRUBIN 0.6   LIPASE 203   GLUCOSE 133*     Recent Labs     03/11/20  1605   APTT 29.6   INR 1.09     Recent Labs     03/11/20  0950   NTPROBNP 1360*         Recent Labs     03/11/20  1605   TROPONINT 60*       Urinalysis:    Recent Labs     03/11/20  1100   SPECGRAVITY 1.020   GLUCOSEUR NEGATIVE   KETONES NEGATIVE   NITRITE NEGATIVE   LEUKESTERAS LARGE*   WBCURINE 11-20*   RBCURINE 6-10*   BACTERIA Rare*        Imaging:  OUTSIDE IMAGES-CT HEAD   Final Result      OUTSIDE IMAGES-DX CHEST   Final Result      OUTSIDE IMAGES-CT CHEST   Final Result      OUTSIDE IMAGES-MR BRAIN   Final Result      EC-ECHOCARDIOGRAM COMPLETE W/ CONT    (Results Pending)         Assessment/Plan:  I anticipate this patient will require at least two midnights for appropriate medical management, necessitating inpatient admission.    * Cerebral infarction (HCC)- (present on admission)  Assessment & Plan  The patient has multiple cerebral infarction areas.  This is indicative of a embolic stroke.  If we do not find an echocardiogram any abnormalities the patient will need a 30-day event monitor.  Suspicion is very high for atrial fibrillation.  Patient at this point should be continued on Plavix and statin therapy.  The patient should be at this point continued also on evaluation for speech for swallowing.  PT OT evaluation has been requested.  Neurology to continue following    Carotid stenosis, right- (present on admission)  Assessment & Plan  Patient has bilateral carotid stenosis and thus at this point the patient remains on Plavix.  After recommendation by neurology aspirin was discontinued which was initially added to it.  The patient failed just Plavix therapy and though should be on dual antiplatelet management.  This can be reassessed in the morning.    T2DM (type 2 diabetes mellitus) (HCC)- (present on admission)  Assessment & Plan  -accus with sliding scale  coverage  -diabetic diet  -diabetic education  -follow glycohemoglobin levels long term, obtain a recent hemoglobin A1c level, 2 months ago it was 10.2  -continue with oral antihyperglycemics  -monitor for hypoglycemic episodes and adjust control if he should get low    HTN (hypertension)- (present on admission)  Assessment & Plan  Permissive hypertension at this point will be utilized.    GERD (gastroesophageal reflux disease)- (present on admission)  Assessment & Plan  Continue with omeprazole 20 mg daily    Hyperlipidemia- (present on admission)  Assessment & Plan  Low-fat low-cholesterol diet  Lipitor increased to 80 mg  Fasting lipid panel      VTE prophylaxis: SCDs

## 2020-03-12 NOTE — DOCUMENTATION QUERY
[facility]                                                                       Query Response Note      PATIENT:               MATT VILLEGAS  ACCT #:                  [id number]  MRN:                     [id number]  :                      1934  ADMIT DATE:       3/11/2020 3:59 PM  DISCH DATE:          RESPONDING  PROVIDER #:        [id number]           QUERY TEXT:    Acute kidney injury is documented in the ED Provider Note.  Can you clarify if you agree with this assessment?    NOTE: if an appropriate response is not listed below, please respond with a new note    The patient's Clinical Indicators include:  Per ED Provider Note: RANDOLPH  3/11 Bun 37, Creatinine 2.1, GFR 22  7/10/2019 Bun 23, Creatinine 1.2, GFR 43  Risk factors: UTI, age  Treatment: IVF  Options provided:   -- Agree with assessment of acute kidney injury   -- Disagree with assessment of acute kidney injury   -- Unable to determine      Query created by: John Clark on 3/12/2020 9:59 AM    RESPONSE TEXT:    Agree with assessment of acute kidney injury          Electronically signed by:  CAROLINE GLASS MD 3/12/2020 3:06 PM

## 2020-03-12 NOTE — ASSESSMENT & PLAN NOTE
Patient has bilateral carotid stenosis and thus at this point the patient remains on Plavix.  After recommendation by neurology aspirin was discontinued which was initially added to it.  The patient failed just Plavix therapy and though should be on dual antiplatelet management.  This can be reassessed in the morning.

## 2020-03-12 NOTE — PROGRESS NOTES
Pt AAox4, Dax. Tremors and RLE weak, pt states not new. Left visual field cut noted, pt states this is new. Denies pain, N/T, N/V. Swallow screening passed, diet started per MD. Up with SBA, steady gait. VSS.

## 2020-03-12 NOTE — CARE PLAN
Problem: Communication  Goal: The ability to communicate needs accurately and effectively will improve  Outcome: PROGRESSING AS EXPECTED     Problem: Safety  Goal: Will remain free from injury  Outcome: PROGRESSING AS EXPECTED  Note: Appropriate precautions in place     Problem: Knowledge Deficit  Goal: Knowledge of disease process/condition, treatment plan, diagnostic tests, and medications will improve  Outcome: PROGRESSING AS EXPECTED  Note: Pt educated on use of bed alarm/call light

## 2020-03-12 NOTE — ASSESSMENT & PLAN NOTE
The patient has multiple cerebral infarction areas.  This is indicative of a embolic stroke.  If we do not find an echocardiogram any abnormalities the patient will need a 30-day event monitor.  Suspicion is very high for atrial fibrillation.  Patient at this point should be continued on Plavix and statin therapy.  The patient should be at this point continued also on evaluation for speech for swallowing.  PT OT evaluation has been requested.  Neurology to continue following

## 2020-03-12 NOTE — CONSULTS
"Neurology Stroke  H&P  Neurohospitalist Service, [facility]    Referring Physician: Charity Glover M.D.    STROKE CODE:   Chief Complaint   Patient presents with   • Possible Stroke     Seen at OSH, + CVA on MRI, improving S/Sx       To obtain the most accurate data regarding the time called, and time patient seen, refer to the stroke run-sheet and chart.  For time of CT, refer to the radiology report. See A&P below for TPA Decision and door to needle time if and when applicable.    HPI: Darleen Verdugo is a 85 y.o. female with history of aortic aneurysm, arthritis, right carotid stenosis, cataract, COPD, diabetes, essential tremor, glaucoma, hyperlipidemia, hypertension, nocturnal hypoxia, stroke (11 years ago per patient), and vertigo presenting to [facility] ER from [facility] for altered mental status and consulted for further evaluation of stroke.  Per the patient, she \"just couldn't wake up\" this morning at approximately 8:00 when her significant other tried to wake her for which he notified EMS.  Patient has no recollection of the events leading to her hospitalization, so majority of HPI comes from hospital chart.  Per hospital chart, patient significant other noted that when she went to bed last night she appeared normal. En route to the outside facility, patient became more aware, was A&O x3, but drowsy upon arrival to ER.  EMS reports patient's blood glucose 170, /90 in route.  Hypoxic at that time so placed on supplemental oxygen. Patient is currently being treated for UTI and was taking Bactrim, but given Rocephin 2 g at outside facility.  UDS returned positive for benzodiazepines, but patient has no history of taking these medications.  MRI brain without contrast at outside facility revealed multifactorial acute infarcts suggestive of embolic etiology.  Patient was then transferred from [facility] to [facility] as outside facility has no neurologist, and [facility] was consulted.  At this time patient denies confusion, headache, vision changes, weakness, numbness or tingling, loss of consciousness, abnormal movements, seizures, ataxia, chest pain, shortness of breath, nausea, vomiting, or bleeding.  She states that she currently takes Plavix, atorvastatin, and insulin among \"a lot of other medications.\"    Review of systems: In addition to what is detailed in the HPI above, (and scanned into the chart if and when applicable), all other systems reviewed and are negative.    Past Medical History:    has a past medical history of Aortic aneurysm, thoracic ([facility]) (10/15/2012), Arthritis, Carotid stenosis, right (10/15/2012), CATARACT, COPD (chronic obstructive pulmonary disease) ([facility]) (10/15/2012), Diabetes, Diverticulosis (10/15/2012), Eczema, Essential tremor (10/15/2012), GERD (gastroesophageal reflux disease), Glaucoma, H/O hyperkalemia (10/15/2012), Hyperlipidemia, Hypertension, Nocturnal hypoxia (10/15/2012), Rosacea, Stroke ([facility]), and Vertigo.    FHx:  family history includes Cancer in her father, mother, and sister; Heart Disease in her son; Hypertension in her mother.    SHx:   reports that she quit smoking about 16 years ago. Her smoking use included cigarettes. She has a 37.50 pack-year smoking history. She has never used smokeless tobacco. She reports current alcohol use. She reports that she does not use drugs.    Allergies:  No Known Allergies    Medications:    Current Facility-Administered Medications:   •  atorvastatin (LIPITOR) tablet 80 mg, 80 mg, Oral, QALiseth TIAN M.D.  •  clopidogrel (PLAVIX) tablet 75 mg, 75 mg, Oral, QAMLiseth M.D.  •  omeprazole (PRILOSEC) capsule 20 mg, 20 mg, Oral, DAILY, Liseth Niño M.D.  •  acetaminophen (TYLENOL) tablet 650 mg, 650 mg, Oral, Q6HRS PRN, Liseth Niño M.D.  •  ondansetron (ZOFRAN) syringe/vial injection 4 mg, 4 mg, " Intravenous, Q4HRS PRN, Liseth Niño M.D.  •  ondansetron (ZOFRAN ODT) dispertab 4 mg, 4 mg, Oral, Q4HRS PRN, Liseth Niño M.D.  •  senna-docusate (PERICOLACE or SENOKOT S) 8.6-50 MG per tablet 2 Tab, 2 Tab, Oral, BID **AND** polyethylene glycol/lytes (MIRALAX) PACKET 1 Packet, 1 Packet, Oral, QDAY PRN **AND** magnesium hydroxide (MILK OF MAGNESIA) suspension 30 mL, 30 mL, Oral, QDAY PRN **AND** bisacodyl (DULCOLAX) suppository 10 mg, 10 mg, Rectal, QDAY PRN, Liseth Niño M.D.  •  Pharmacy consult request - Allow for permissive hypertension: SBP up to 220 mmHg/DBP up to 120 mmHg x 48 hours, , Other, PHARMACY TO DOSE, Liseth Niño M.D.  •  Respiratory Therapy Consult, , Nebulization, Continuous RT, Liseth Niño M.D.  •  D5 1/2 NS infusion, , Intravenous, Continuous, Liseth Niño M.D.  •  Notify provider if pain remains uncontrolled, , , CONTINUOUS **AND** Use the numeric rating scale (NRS-11) on regular floors and Critical-Care Pain Observation Tool (CPOT) on ICUs/Trauma to assess pain, , , CONTINUOUS **AND** Pulse Ox (Oximetry), , , CONTINUOUS **AND** Pharmacy Consult Request ...Pain Management Review 1 Each, 1 Each, Other, PHARMACY TO DOSE **AND** If patient difficult to arouse and/or has respiratory depression, stop any opiates that are currently infusing and call a Rapid Response., , , CONTINUOUS **AND** oxyCODONE immediate-release (ROXICODONE) tablet 2.5 mg, 2.5 mg, Oral, Q3HRS PRN **AND** oxyCODONE immediate-release (ROXICODONE) tablet 5 mg, 5 mg, Oral, Q3HRS PRN **AND** morphine (pf) 4 MG/ML injection 2 mg, 2 mg, Intravenous, Q3HRS PRN, Liseth Niño M.D.  •  labetalol (NORMODYNE/TRANDATE) injection 10 mg, 10 mg, Intravenous, Q4HRS PRN **OR** hydrALAZINE (APRESOLINE) injection 10 mg, 10 mg, Intravenous, Q2HRS PRN, Liseth Niño M.D.  •  aspirin (ASA) tablet 325 mg, 325 mg, Oral, DAILY **OR** aspirin (ASA) chewable tab 324 mg, 324 mg, Oral, DAILY **OR** aspirin (ASA) suppository 300 mg, 300 mg,  "Rectal, DAILY, Liseth Niño M.D.    Current Outpatient Medications:   •  Cyanocobalamin (VITAMIN B-12 PO), Take 1 Tab by mouth every morning., Disp: , Rfl:   •  atorvastatin (LIPITOR) 20 MG Tab, Take 20 mg by mouth every morning., Disp: , Rfl:   •  clopidogrel (PLAVIX) 75 MG Tab, Take 75 mg by mouth every morning., Disp: , Rfl:   •  enalapril (VASOTEC) 10 MG Tab, Take 10 mg by mouth 2 Times a Day., Disp: , Rfl:   •  glimepiride (AMARYL) 4 MG Tab, Take 4 mg by mouth 2 Times a Day., Disp: , Rfl:   •  insulin glargine (LANTUS) 100 UNIT/ML Solution, Inject 10 Units as instructed every evening., Disp: , Rfl:   •  metformin (GLUCOPHAGE) 1000 MG tablet, Take 1,000 mg by mouth 2 Times a Day., Disp: , Rfl:   •  propranolol (INDERAL) 80 MG Tab, Take 80 mg by mouth 2 Times a Day., Disp: , Rfl:   •  sulfamethoxazole-trimethoprim (BACTRIM) 400-80 MG Tab, Take 1 Tab by mouth 2 times a day. 7 day course, Disp: , Rfl:   •  omeprazole (PRILOSEC) 20 MG delayed-release capsule, Take 20 mg by mouth every day., Disp: , Rfl:   •  CRANBERRY PO, Take 1 Tab by mouth every morning., Disp: , Rfl:   •  Cholecalciferol (VITAMIN D PO), Take 1 Tab by mouth every morning., Disp: , Rfl:     Physical Examination:    Vitals:    03/11/20 1604 03/11/20 1620 03/11/20 1631   BP: 117/77  (!) 97/59   Pulse: 66  79   Resp: 20  17   Temp: 36.8 °C (98.2 °F)     TempSrc: Temporal     SpO2: (!) 87% 92% 98%   Weight: 80.2 kg (176 lb 11.2 oz)     Height: 1.626 m (5' 4\")         General: Patient is awake and in no acute distress  Eyes: examination of optic disks not indicated at this time  CV: RRR, no murmur.  Resp: CTA bilaterally on RA, mild dyspnea.    NEUROLOGICAL EXAM:     Mental status: Awake, alert and oriented to person, time, and place, but not situation. She follows commands.  Speech and language: speech is clear and fluent. The patient is able to name and repeat.  Cranial nerve exam: Pupils are 3mm, equal, round, and reactive to light bilaterally. " Visual fields reveal complete left hemianopia. Extraocular muscles are intact. Sensation in the face is intact to light touch. Face is symmetric. Hearing to finger rub equal. Palate elevates symmetrically. Shoulder shrug is full. Tongue is midline.  Motor exam: Strength is 3+/5 in RUE (residual weakness from prior CVA per patient), and 4/5 in all extremities both distally and proximally. Tone is normal. No abnormal movements were seen on exam.  Sensory exam: No sensory deficits identified   Deep tendon reflexes:  2+ bicep and brachioradialis and 1+ patellar and achilles and symmetric. Toes down-going bilaterally.  Coordination: Mild to moderate ataxia with finger-nose-finger and ankle-down-shin tests. Baseline essential tremor noted.  Gait: deferred as patient is high fall risk and having some dyspnea.    NIH Stroke Scale:    1a. Level of Consciousness (Alert, drowsy, etc): 0= Alert    1b. LOC Questions (Month, age): 0= Answers both correctly    1c. LOC Commands (Open/close eyes make fist/let go): 0= Obeys both correctly    2.   Best Gaze (Eyes open - patient follows examiner's finger on face): 0= Normal    3.   Visual Fields (introduce visual stimulus/threat to patient's field quadrants): 2= Complete Hemianopia     4.   Facial Paresis (Show teeth, raise eyebrows and squeeze eyes shut): 0= Normal     5a. Motor Arm - Left (Elevate arm to 90 degrees if patient is sitting, 45 degrees if  supine): 0= No drift    5b. Motor Arm - Right (Elevate arm to 90 degrees if patient is sitting, 45 degrees if supine): 1= Drift    6a. Motor Leg - Left (Elevate leg 30 degrees with patient supine): 0= No drift    6b. Motor Leg - Right  (Elevate leg 30 degrees with patient supine): 0= No drift    7.   Limb Ataxia (Finger-nose, heel down shin): 2- Present in 2 limbs    8.   Sensory (Pin prick to face, arm, trunk and leg - compare side to side): 0= Normal    9.  Best Language (Name item, describe a picture and read sentences): 0= No  aphasia    10. Dysarthria (Evaluate speech clarity by patient repeating listed words): 0= Normal articulation    11. Extinction and Inattention (Use information from prior testing to identify neglect or  double simultaneous stimuli testing): 0= No neglect    Total NIH Score: 5    Presumed Mechanism by TOAST:  Likely cardioembolic    Objective Data:    Labs:  No results found for: PROTHROMBTM, INR   Lab Results   Component Value Date/Time    WBC 7.7 03/11/2020 09:50 AM    RBC 3.80 (L) 03/11/2020 09:50 AM    HEMOGLOBIN 11.8 (L) 03/11/2020 09:50 AM    HEMATOCRIT 35.6 (L) 03/11/2020 09:50 AM    MCV 93.7 03/11/2020 09:50 AM    MCH 31.1 (H) 03/11/2020 09:50 AM    MCHC 33.1 03/11/2020 09:50 AM    MPV 10.1 03/11/2020 09:50 AM      Lab Results   Component Value Date/Time    SODIUM 138 03/11/2020 09:50 AM    POTASSIUM 4.3 03/11/2020 09:50 AM    CHLORIDE 104 03/11/2020 09:50 AM    CO2 22 03/11/2020 09:50 AM    GLUCOSE 133 (H) 03/11/2020 09:50 AM    BUN 37 (H) 03/11/2020 09:50 AM    CREATININE 2.1 (H) 03/11/2020 09:50 AM      Lab Results   Component Value Date/Time    CHOLSTRLTOT 145 07/10/2019 10:52 AM    LDL 68 07/10/2019 10:52 AM    HDL 45.0 07/10/2019 10:52 AM    TRIGLYCERIDE 162 (H) 07/10/2019 10:52 AM       Lab Results   Component Value Date/Time    ALKPHOSPHAT 71 03/11/2020 09:50 AM    ASTSGOT 15 03/11/2020 09:50 AM    ALTSGPT 12 03/11/2020 09:50 AM    TBILIRUBIN 0.6 03/11/2020 09:50 AM        Imaging/Testing:    I interpreted and/or reviewed the patient's neuroimaging    OUTSIDE IMAGES-CT HEAD   Final Result      OUTSIDE IMAGES-DX CHEST   Final Result      OUTSIDE IMAGES-CT CHEST   Final Result      OUTSIDE IMAGES-MR BRAIN   Final Result      EC-ECHOCARDIOGRAM COMPLETE W/ CONT    (Results Pending)       Assessment and Plan:    Darleen Verdugo is a 85 y.o. female with relevant history of thoracic aortic aneurysm, arthritis, right carotid stenosis, cataract, COPD, diabetes, essential tremor, glaucoma, hyperlipidemia,  "hypertension, nocturnal hypoxia, RANDOLPH, stroke (11 years ago per patient), and vertigo presenting for altered mental status whom neurology was consulted to address further workup of stroke.  Per the patient, she \"just couldn't wake up\" this morning at approximately 8:00 when her significant other tried to wake her for which he notified EMS. Patient has no recollection of the events leading to her hospitalization, so majority of HPI comes from hospital chart.  Per hospital chart, patient's significant other noted that when she went to bed last night she appeared normal. En route to the outside facility, patient became more aware, was A&O x3, but drowsy upon arrival to ER. EMS reports patient's blood glucose 170, /90 en route. Patient is currently being treated for UTI and was taking Bactrim, but given Rocephin 2 g at outside facility. UDS returned positive for benzodiazepines, but patient has no history of taking these medications.  MRI brain without contrast at outside facility revealed multifactorial acute infarcts suggestive of embolic etiology.  Patient was then transferred from [facility] to [facility] as outside facility has no neurologist, and [facility] was consulted.    Impression: Multifocal acute infarcts possibly secondary to embolic phenomenon versus watershed infarcts.    Plan:  -Brain MRI at outside hospital revealed multifocal acute infarcts (bilateral cortical occipital) as well as moderate to severe chronic small vessel ischemic changes with chronic left occipital lobe and left basal ganglia infarcts.  -q4h and PRN neuro assessment. VS per nursing/unit protocol. Permissive HTN ok for 48 hours (until 3/13/20) not to exceed SBP > 220, DBP > 105. Then, BP goal < 140/90. Antihypertensives per primary team.   -Telemetry and pulse oximetry; currently SR. Screen for Afib/arrhythmia. Obtain TTE with bubble study.   -Recommend extended cardiac event monitoring " for Afib (e.g. Zio patch or Loop recorder)  -Patient may need anticoagulation if Afib diagnosis is confirmed as suspicion for cardioembolic etiology is high, but but needs definitive diagnosis first.  -Clopidogrel 75mg PO daily and Atorvastatin 80 mg PO q HS. Check lipid panel.   -Recommend aggressive BG management per primary team. Avoid IVF with Dextrose. BG goal 140-180. Check hemoglobin A1c. Note last hemoglobin A1c on 1/14/20 of 10.2.   -PT/OT/SLP eval and treat.   -Counseled patient at length regarding life style and risk factor modification for secondary stroke prevention.   -All other medical management per primary team.   -DVT PPX: SCDs.      The evaluation of the patient, and recommended management, was discussed with the Dr. Ross, Dr. Niño, and bedside RN.     JUANCARLOS Sequeira.  Nurse Practitioner, Neurohospitalist  [facility]  (t) [phone]  (f) [phone]

## 2020-03-12 NOTE — CARE PLAN
Problem: Safety  Goal: Will remain free from injury  Outcome: PROGRESSING AS EXPECTED     Problem: Venous Thromboembolism (VTW)/Deep Vein Thrombosis (DVT) Prevention:  Goal: Patient will participate in Venous Thrombosis (VTE)/Deep Vein Thrombosis (DVT)Prevention Measures  Outcome: PROGRESSING AS EXPECTED   Bed alarm and SCDs in place, left sided visual deficits discussed

## 2020-03-12 NOTE — PROGRESS NOTES
Monitor summary: SR 68-87, IL 0.20, QRS 0.08, QT 0.36 with frequent PACs per strip from monitor room.

## 2020-03-12 NOTE — ASSESSMENT & PLAN NOTE
-accus with sliding scale coverage  -diabetic diet  -diabetic education  -follow glycohemoglobin levels long term, obtain a recent hemoglobin A1c level, 2 months ago it was 10.2  -continue with oral antihyperglycemics  -monitor for hypoglycemic episodes and adjust control if he should get low

## 2020-03-12 NOTE — THERAPY
"Physical Therapy Evaluation completed.   Bed Mobility: Supervised     Transfers: Stand By Assist   Gait: Stand By Assist with No Equipment Needed       Plan of Care: 3x/wee  Discharge Recommendations: Equipment: No Equipment Needed. Post-acute therapy: Transitional Care    Pt admitted for possible stroke workup and presents, per her report, very near or at her functional baseline. She is somewhat tremulous during movement, but reports this is not new. During gait x200' without device, pt appeared to have difficulty navigating around obstacles and when questioned regarding her vision, denied impairment. Suspect pt may actually have visual impairment as she was unable to locate tissue box on her tray table amongst other objects. RN and OT notified.     Discussed with OT: pt with significant visual impairments likely impacting her balance in community environments. Recommend placement for higher level balance training and fall risk. Acute PT will follow to address fall risk and instability.      See \"Rehab Therapy-Acute\" Patient Summary Report for complete documentation.     "

## 2020-03-12 NOTE — PROGRESS NOTES
Neurology Progress Note  Neurohospitalist Service, [facility]    Referring Physician: Wendi Boyd M.D.    Chief Complaint   Patient presents with   • Possible Stroke     Seen at OSH, + CVA on MRI, improving S/Sx       HPI: Refer to initial documented Neurology H&P, as detailed in the patient's chart.    Interval History 3/12/2020: Darleen Verdugo remains admitted to the neuroscience unit from the ER after transfer from outside hospital presenting yesterday for altered mental status and neurology consulted for further evaluation of stroke.  Patient still cannot recollect events leading to her hospitalization, but knows she could not wake up yesterday morning when significant other tried to wake her, but is otherwise alert and oriented to person, place. and time.  She denies confusion, headache, vision changes, numbness or tingling, loss of consciousness, or abnormal movements.  She states today that she had no previous deficits after her stroke 11 years ago, contrary to reporting yesterday that her right upper extremity weakness was due to her prior CVA.  Per bedside RN patient passed swallow evaluation and diet ordered.  No other medical complaints at this time.    Past Medical History:   Past Medical History:   Diagnosis Date   • Aortic aneurysm, thoracic ([facility]) 10/15/2012   • Arthritis     osteoarthritis   • Carotid stenosis, right 10/15/2012   • CATARACT     bilateral   • COPD (chronic obstructive pulmonary disease) ([facility]) 10/15/2012   • Diabetes     type 2   • Diverticulosis 10/15/2012   • Eczema    • Essential tremor 10/15/2012   • GERD (gastroesophageal reflux disease)    • Glaucoma    • H/O hyperkalemia 10/15/2012   • Hyperlipidemia    • Hypertension    • Nocturnal hypoxia 10/15/2012   • Rosacea    • Stroke ([facility])    • Vertigo         FHx:  Family History   Problem Relation Age of Onset   • Hypertension Mother    • Cancer Mother         lymphoma   • Cancer Father         lung   •  Cancer Sister    • Heart Disease Son         SHx:  Social History     Socioeconomic History   • Marital status:      Spouse name: Not on file   • Number of children: Not on file   • Years of education: Not on file   • Highest education level: Not on file   Occupational History   • Not on file   Social Needs   • Financial resource strain: Not on file   • Food insecurity     Worry: Not on file     Inability: Not on file   • Transportation needs     Medical: Not on file     Non-medical: Not on file   Tobacco Use   • Smoking status: Former Smoker     Packs/day: 0.75     Years: 50.00     Pack years: 37.50     Types: Cigarettes     Last attempt to quit: 10/20/2003     Years since quittin.4   • Smokeless tobacco: Never Used   Substance and Sexual Activity   • Alcohol use: Yes     Comment: rarely   • Drug use: No   • Sexual activity: Not on file   Lifestyle   • Physical activity     Days per week: Not on file     Minutes per session: Not on file   • Stress: Not on file   Relationships   • Social connections     Talks on phone: Not on file     Gets together: Not on file     Attends Restoration service: Not on file     Active member of club or organization: Not on file     Attends meetings of clubs or organizations: Not on file     Relationship status: Not on file   • Intimate partner violence     Fear of current or ex partner: Not on file     Emotionally abused: Not on file     Physically abused: Not on file     Forced sexual activity: Not on file   Other Topics Concern   • Not on file   Social History Narrative   • Not on file        Medications:    Current Facility-Administered Medications:   •  atorvastatin (LIPITOR) tablet 80 mg, 80 mg, Oral, Liseth PAYTON M.D., 80 mg at 20  •  clopidogrel (PLAVIX) tablet 75 mg, 75 mg, Oral, QALiseth TIAN M.D., 75 mg at 20  •  omeprazole (PRILOSEC) capsule 20 mg, 20 mg, Oral, DAILY, Liseth Niño M.D., 20 mg at 20  •  acetaminophen  (TYLENOL) tablet 650 mg, 650 mg, Oral, Q6HRS PRN, Liseth Niño M.D.  •  ondansetron (ZOFRAN) syringe/vial injection 4 mg, 4 mg, Intravenous, Q4HRS PRN, Liseth Niño M.D.  •  ondansetron (ZOFRAN ODT) dispertab 4 mg, 4 mg, Oral, Q4HRS PRN, Liseth Niño M.D.  •  senna-docusate (PERICOLACE or SENOKOT S) 8.6-50 MG per tablet 2 Tab, 2 Tab, Oral, BID, 2 Tab at 03/12/20 0929 **AND** polyethylene glycol/lytes (MIRALAX) PACKET 1 Packet, 1 Packet, Oral, QDAY PRN **AND** magnesium hydroxide (MILK OF MAGNESIA) suspension 30 mL, 30 mL, Oral, QDAY PRN **AND** bisacodyl (DULCOLAX) suppository 10 mg, 10 mg, Rectal, QDAY PRN, Liseth Niño M.D.  •  Pharmacy consult request - Allow for permissive hypertension: SBP up to 220 mmHg/DBP up to 120 mmHg x 48 hours, , Other, PHARMACY TO DOSE, Liseth Niño M.D.  •  Respiratory Therapy Consult, , Nebulization, Continuous RT, Liseth Niño M.D.  •  Notify provider if pain remains uncontrolled, , , CONTINUOUS **AND** Use the numeric rating scale (NRS-11) on regular floors and Critical-Care Pain Observation Tool (CPOT) on ICUs/Trauma to assess pain, , , CONTINUOUS **AND** Pulse Ox (Oximetry), , , CONTINUOUS **AND** Pharmacy Consult Request ...Pain Management Review 1 Each, 1 Each, Other, PHARMACY TO DOSE **AND** If patient difficult to arouse and/or has respiratory depression, stop any opiates that are currently infusing and call a Rapid Response., , , CONTINUOUS **AND** oxyCODONE immediate-release (ROXICODONE) tablet 2.5 mg, 2.5 mg, Oral, Q3HRS PRN **AND** oxyCODONE immediate-release (ROXICODONE) tablet 5 mg, 5 mg, Oral, Q3HRS PRN **AND** morphine (pf) 4 MG/ML injection 2 mg, 2 mg, Intravenous, Q3HRS PRN, Liseth Niño M.D.  •  labetalol (NORMODYNE/TRANDATE) injection 10 mg, 10 mg, Intravenous, Q4HRS PRN **OR** hydrALAZINE (APRESOLINE) injection 10 mg, 10 mg, Intravenous, Q2HRS PRN, Liseth Niño M.D.    Allergies:  No Known Allergies     Review of systems:   Constitutional: denies  fever, night sweats, weight loss.   Eyes: See HPI; denies eye pain or secretion.   Ears, Nose, Mouth, Throat: denies nasal secretion, nasal bleeding, difficulty swallowing, hearing loss, tinnitus, vertigo, ear pain, acute dental problems, oral ulcers or lesions.   Endocrine: denies recent weight changes, heat or cold intolerance, polyuria, polydypsia, polyphagia,abnormal hair growth.  Cardiovascular: denies new onset of chest pain, palpitations, syncope, or dyspnea of exertion.  Pulmonary: Endorses wheezing; denies shortness of breath, new onset of cough, hemoptysis, chest pain or flu-like symptoms.   GI: denies nausea, vomiting, diarrhea, GI bleeding, change in appetite, abdominal pain, and change in bowel habits.  : denies dysuria, urinary incontinence, hematuria.  Heme/oncology: denies history of easy bruising or bleeding. No history of cancer, DVTor PE.  Allergy/immunology: denies hives/urticaria, or itching.   Dermatologic: denies new rash, or new skin lesions.  Musculoskeletal:denies joint swelling or pain, muscle pain, neck and back pain.   Neurologic: denies headaches, facial droopiness, ataxia, change in speech or language, memory loss.    Psychiatric: denies symptoms of depression, anxiety, hallucinations, mood swings or changes, suicidal or homicidal thoughts.     Physical Examination:  Vitals:    03/11/20 2116 03/12/20 0000 03/12/20 0400 03/12/20 0800   BP:  112/48 101/81 125/75   Pulse: 73 64 91 97   Resp: 16 16 18 16   Temp:  36.7 °C (98.1 °F) 37 °C (98.6 °F) 36.4 °C (97.6 °F)   TempSrc:  Temporal Temporal Temporal   SpO2: 97% 98% 95% 98%   Weight:       Height:         General: Patient in no acute distress, pleasant and cooperative.  HEENT: Normocephalic, no signs of acute trauma.   Neck: supple, no meningeal signs or carotid bruits. There is normal range of motion. No tenderness on exam.   Chest: clear to auscultation, mild to moderate wheeze No cough.   CV: RRR, no murmurs.   Skin: no signs of  acute rashes or trauma.   Musculoskeletal: joints exhibit full range of motion, without any pain to palpation. There are no signs of joint or muscle swelling. There is no tenderness to deep palpation of muscles.   Psychiatric: No hallucinatory behavior. Denies symptoms of depression or suicidal ideation. Mood and affect appear normal on exam.     NEUROLOGICAL EXAM:   Mental status, orientation: Awake, alert and oriented to person, place, time, but not situation.   Speech and language: speech is clear and fluent. The patient is able to name, repeat and comprehend.   Memory: There is intact recollection of recent and remote events.   Cranial nerve exam: Pupils are 3 mm bilaterally and equally reactive to light and accommodation. Visual fields reveal left complete hemianopia by confrontation and finger vision test. There is no nystagmus on primary or secondary gaze. Intact full EOM in all directions of gaze. Face appears symmetric. Sensation in the face is intact to light touch. Uvula is midline. Palate elevates symmetrically. Tongue is midline and without any signs of tongue biting or fasciculations. Sternocleidomastoid muscles exhibit is normal strength bilaterally. Shoulder shrug is intact bilaterally.   Motor exam: Strength is 3+/5 in RUE, and 4/5 in all extremities. Tone is normal. No abnormal movements were seen on exam.   Sensory exam reveals normal sense of light touch and pinprick in all extremities.   Deep tendon reflexes:  2+ bicep and brachioradialis and 1+ patellar and Achilles. Plantar responses are flexor. There is no clonus.   Coordination: Mild to moderate ataxia finger-nose-finger and ankle-down-shin tests.  Baseline essential tremor noted.  Gait: Deferred as patient is high fall risk.    NIH Stroke Scale  3/12/20    1a. Level of Consciousness (Alert, drowsy, etc): 0= Alert    1b. LOC Questions (Month, age): 0= Answers both correctly    1c. LOC Commands (Open/close eyes make fist/let go): 0= Obeys  both correctly    2.   Best Gaze (Eyes open - patient follows examiner's finger on face): 0= Normal    3.   Visual Fields (introduce visual stimulus/threat to patient's field quadrants): 2= Complete Hemianopia    4.   Facial Paresis (Show teeth, raise eyebrows and squeeze eyes shut): 0= Normal     5a. Motor Arm - Left (Elevate arm to 90 degrees if patient is sitting, 45 degrees if  supine): 0= No drift    5b. Motor Arm - Right (Elevate arm to 90 degrees if patient is sitting, 45 degrees if supine): 1= Drift    6a. Motor Leg - Left (Elevate leg 30 degrees with patient supine): 0= No drift    6b. Motor Leg - Right  (Elevate leg 30 degrees with patient supine): 0= No drift    7.   Limb Ataxia (Finger-nose, heel down shin): 2- Present in 2 limbs    8.   Sensory (Pin prick to face, arm, trunk and leg - compare side to side): 0= Normal    9.  Best Language (Name item, describe a picture and read sentences): 0= No aphasia    10. Dysarthria (Evaluate speech clarity by patient repeating listed words): 0= Normal articulation    11. Extinction and Inattention (Use information from prior testing to identify neglect or  double simultaneous stimuli testing): 0= No neglect    Total NIH Score: 5          Ancillary Data Reviewed:    Labs:  Lab Results   Component Value Date/Time    PROTHROMBTM 14.3 03/11/2020 04:05 PM    INR 1.09 03/11/2020 04:05 PM      Lab Results   Component Value Date/Time    WBC 7.7 03/11/2020 09:50 AM    RBC 3.80 (L) 03/11/2020 09:50 AM    HEMOGLOBIN 11.8 (L) 03/11/2020 09:50 AM    HEMATOCRIT 35.6 (L) 03/11/2020 09:50 AM    MCV 93.7 03/11/2020 09:50 AM    MCH 31.1 (H) 03/11/2020 09:50 AM    MCHC 33.1 03/11/2020 09:50 AM    MPV 10.1 03/11/2020 09:50 AM      Lab Results   Component Value Date/Time    SODIUM 138 03/11/2020 09:50 AM    POTASSIUM 4.3 03/11/2020 09:50 AM    CHLORIDE 104 03/11/2020 09:50 AM    CO2 22 03/11/2020 09:50 AM    GLUCOSE 133 (H) 03/11/2020 09:50 AM    BUN 37 (H) 03/11/2020 09:50 AM     "CREATININE 2.1 (H) 03/11/2020 09:50 AM      Lab Results   Component Value Date/Time    CHOLSTRLTOT 147 03/12/2020 06:33 AM    LDL 80 03/12/2020 06:33 AM    HDL 35 (A) 03/12/2020 06:33 AM    TRIGLYCERIDE 158 (H) 03/12/2020 06:33 AM       Lab Results   Component Value Date/Time    ALKPHOSPHAT 71 03/11/2020 09:50 AM    ASTSGOT 15 03/11/2020 09:50 AM    ALTSGPT 12 03/11/2020 09:50 AM    TBILIRUBIN 0.6 03/11/2020 09:50 AM        Imaging/Testing:    I interpreted and/or reviewed the patient's neuroimaging    OUTSIDE IMAGES-CT HEAD   Final Result      OUTSIDE IMAGES-DX CHEST   Final Result      OUTSIDE IMAGES-CT CHEST   Final Result      OUTSIDE IMAGES-MR BRAIN   Final Result      EC-ECHOCARDIOGRAM COMPLETE W/ CONT    (Results Pending)       Assessment and Plan:    Darleen Verdugo is a 85 y.o. female with relevant history of thoracic aortic aneurysm, arthritis, right carotid stenosis, cataract, COPD, diabetes, essential tremor, glaucoma, hyperlipidemia, hypertension, nocturnal hypoxia, RNADOLPH, stroke (11 years ago per patient), and vertigo presenting for altered mental status whom neurology was consulted to address further workup of stroke.  Per the patient, she \"just couldn't wake up\" yesterday morning at approximately 8:00 when her significant other tried to wake her for which he notified EMS. Patient has no recollection of the events leading to her hospitalization, so majority of HPI comes from hospital chart.  Per hospital chart, patient's significant other noted that when she went to bed last night she appeared normal. En route to the outside facility, patient became more aware, was A&O x3, but drowsy upon arrival to ER. EMS reports patient's blood glucose 170, /90 en route. Patient is was being treated for UTI and was taking Bactrim, but given Rocephin 2 g at outside facility. UDS returned positive for benzodiazepines, but patient has no history of taking these medications.  MRI brain without contrast at " outside facility revealed multifactorial acute infarcts suggestive of embolic etiology.  Patient was then transferred from [facility] to [facility] as outside facility has no neurologist, and [facility] Neurology was consulted.     Impression: Multifocal acute infarcts possibly secondary to embolic phenomenon versus watershed infarcts.    Plan:  -Brain MRI at outside hospital revealed multifocal acute infarcts as well as moderate to severe chronic small vessel ischemic changes with chronic left occipital lobe and left basal ganglia infarcts. Imaging results consistent with assessment findings of left complete hemianopia, ataxia, and RUE weakness (regardless if residual or new onset).  --q4h and PRN neuro assessment. VS per nursing/unit protocol. Permissive HTN ok for 48 hrs (until 3/13/20), not to exceed SBP > 220, DBP > 105. Then, BP goal < 140/90. Antihypertensives per primary team.   -Telemetry and pulse oximetry; currently SR. Screen for A. Fib/arrhythmia. Obtain TTE with bubble study.   -Recommend extended cardiac event monitoring for A. Fib (Zio patch or Loop recorder)  -Continue Clopidogrel 75mg PO q day and Atorvastatin 80 mg PO q HS. Note lipid panel: triglycerides 158, HDL 35, LDL 80.  -ASA not recommended as there is no evidence to support increased antiplatelet efficacy with this patient's clinical presentation.   -Recommend aggressive BG management per primary team. Avoid IVF with Dextrose. BG goal 140-180. Note hemoglobin A1c is 8.3.   -PT/OT/SLP eval and treat.   -Counseled patient at length regarding life style and risk factor modification for secondary stroke prevention.   -All other medical management per primary team.   -DVT PPX: SCDs.     The evaluation of the patient, and recommended management, was discussed with Dr Ross, Zee Melvin, A.P.R.N., Dr. Boyd, and bedside RN. I have performed a physical exam and reviewed and updated ROS and Plan today  (3/12/2020). In review of yesterday's note (3/11/2020), there are no changes except as documented above.    JUANCARLOS Sequeira.   Nurse Practitioner, Neurohospitalist  [facility]  t) [phone] (f) [phone]

## 2020-03-13 ENCOUNTER — APPOINTMENT (OUTPATIENT)
Dept: CARDIOLOGY | Facility: MEDICAL CENTER | Age: 85
DRG: 065 | End: 2020-03-13
Attending: HOSPITALIST
Payer: MEDICARE

## 2020-03-13 ENCOUNTER — PATIENT OUTREACH (OUTPATIENT)
Dept: HEALTH INFORMATION MANAGEMENT | Facility: OTHER | Age: 85
End: 2020-03-13

## 2020-03-13 VITALS
TEMPERATURE: 98 F | DIASTOLIC BLOOD PRESSURE: 67 MMHG | HEART RATE: 77 BPM | WEIGHT: 176.7 LBS | SYSTOLIC BLOOD PRESSURE: 168 MMHG | HEIGHT: 64 IN | OXYGEN SATURATION: 93 % | BODY MASS INDEX: 30.17 KG/M2 | RESPIRATION RATE: 20 BRPM

## 2020-03-13 LAB
ANION GAP SERPL CALC-SCNC: 8 MMOL/L (ref 7–16)
BUN SERPL-MCNC: 29 MG/DL (ref 8–22)
CALCIUM SERPL-MCNC: 9.2 MG/DL (ref 8.5–10.5)
CHLORIDE SERPL-SCNC: 107 MMOL/L (ref 96–112)
CO2 SERPL-SCNC: 22 MMOL/L (ref 20–33)
CREAT SERPL-MCNC: 1.48 MG/DL (ref 0.5–1.4)
GLUCOSE BLD-MCNC: 142 MG/DL (ref 65–99)
GLUCOSE SERPL-MCNC: 143 MG/DL (ref 65–99)
LV EJECT FRACT  99904: 70
LV EJECT FRACT MOD 2C 99903: 58.63
LV EJECT FRACT MOD 4C 99902: 61.91
LV EJECT FRACT MOD BP 99901: 59.32
POTASSIUM SERPL-SCNC: 4.2 MMOL/L (ref 3.6–5.5)
SODIUM SERPL-SCNC: 137 MMOL/L (ref 135–145)

## 2020-03-13 PROCEDURE — 80048 BASIC METABOLIC PNL TOTAL CA: CPT

## 2020-03-13 PROCEDURE — A9270 NON-COVERED ITEM OR SERVICE: HCPCS | Performed by: HOSPITALIST

## 2020-03-13 PROCEDURE — 82962 GLUCOSE BLOOD TEST: CPT

## 2020-03-13 PROCEDURE — 700102 HCHG RX REV CODE 250 W/ 637 OVERRIDE(OP): Performed by: HOSPITALIST

## 2020-03-13 PROCEDURE — 36415 COLL VENOUS BLD VENIPUNCTURE: CPT

## 2020-03-13 PROCEDURE — 99239 HOSP IP/OBS DSCHRG MGMT >30: CPT | Performed by: INTERNAL MEDICINE

## 2020-03-13 PROCEDURE — 99232 SBSQ HOSP IP/OBS MODERATE 35: CPT | Performed by: NURSE PRACTITIONER

## 2020-03-13 PROCEDURE — 93306 TTE W/DOPPLER COMPLETE: CPT

## 2020-03-13 PROCEDURE — 93306 TTE W/DOPPLER COMPLETE: CPT | Mod: 26 | Performed by: INTERNAL MEDICINE

## 2020-03-13 RX ORDER — ATORVASTATIN CALCIUM 40 MG/1
40 TABLET, FILM COATED ORAL DAILY
Qty: 30 TAB | Refills: 2 | Status: SHIPPED | OUTPATIENT
Start: 2020-03-13 | End: 2020-04-12

## 2020-03-13 RX ADMIN — CLOPIDOGREL BISULFATE 75 MG: 75 TABLET ORAL at 04:50

## 2020-03-13 RX ADMIN — SENNOSIDES AND DOCUSATE SODIUM 2 TABLET: 8.6; 5 TABLET ORAL at 04:50

## 2020-03-13 RX ADMIN — OMEPRAZOLE 20 MG: 20 CAPSULE, DELAYED RELEASE ORAL at 04:50

## 2020-03-13 RX ADMIN — ATORVASTATIN CALCIUM 80 MG: 20 TABLET, FILM COATED ORAL at 04:50

## 2020-03-13 NOTE — PROGRESS NOTES
Monitor summary: SR 73-83, MA 0.20, QRS 0.08, QT 0.40, with rare PVCs and rare PACs per strip from monitor room.

## 2020-03-13 NOTE — DISCHARGE SUMMARY
HOSPITAL MEDICINE DISCHARGE SUMMARY    DATE OF ADMISSION:  3/11/2020    DATE OF DISCHARGE:  3/13/2020    CHIEF COMPLAINT ON ADMISSION  Chief Complaint   Patient presents with   • Possible Stroke     Seen at OSH, + CVA on MRI, improving S/Sx       CODE STATUS  Full Code    No Known Allergies    DISCHARGE PROBLEM LIST  Principal Problem (Resolved):    Cerebral infarction (HCC) POA: Yes      Overview: Mike update 10/1/2016  Active Problems:    Carotid stenosis, right (Chronic) POA: Yes    HTN (hypertension) (Chronic) POA: Yes    T2DM (type 2 diabetes mellitus) (HCC) POA: Yes    Hyperlipidemia (Chronic) POA: Yes    GERD (gastroesophageal reflux disease) (Chronic) POA: Yes      MEDICATIONS ON DISCHARGE     Medication List      CHANGE how you take these medications      Instructions   atorvastatin 40 MG Tabs  What changed:    · medication strength  · how much to take  · when to take this  Commonly known as:  LIPITOR   Take 1 Tab by mouth every day for 30 days.  Dose:  40 mg        CONTINUE taking these medications      Instructions   clopidogrel 75 MG Tabs  Commonly known as:  PLAVIX   Take 75 mg by mouth every morning.  Dose:  75 mg     CRANBERRY PO   Take 1 Tab by mouth every morning.  Dose:  1 Tab     enalapril 10 MG Tabs  Commonly known as:  VASOTEC   Take 10 mg by mouth 2 Times a Day.  Dose:  10 mg     glimepiride 4 MG Tabs  Commonly known as:  AMARYL   Take 4 mg by mouth 2 Times a Day.  Dose:  4 mg     insulin glargine 100 UNIT/ML Soln  Commonly known as:  LANTUS   Inject 10 Units as instructed every evening.  Dose:  10 Units     metformin 1000 MG tablet  Commonly known as:  GLUCOPHAGE   Take 1,000 mg by mouth 2 Times a Day.  Dose:  1,000 mg     omeprazole 20 MG delayed-release capsule  Commonly known as:  PRILOSEC   Take 20 mg by mouth every day.  Dose:  20 mg     propranolol 80 MG Tabs  Commonly known as:  INDERAL   Take 80 mg by mouth 2 Times a Day.  Dose:  80 mg     sulfamethoxazole-trimethoprim 400-80 MG  Tabs  Commonly known as:  BACTRIM   Take 1 Tab by mouth 2 times a day. 7 day course  Dose:  1 Tab     VITAMIN B-12 PO   Take 1 Tab by mouth every morning.  Dose:  1 Tab     VITAMIN D PO   Take 1 Tab by mouth every morning.  Dose:  1 Tab            CONSULTATIONS  Neurology    HPI & HOSPITAL COURSE  85-year-old woman who presented to the hospital on transfer from an outside facility for possible stroke.  MRI of the brain here show multiple lesions acutely in different territories, suspicious for embolic phenomenon from a cardiac source, most likely secondary to alcohol atrial fibrillation, though EKG and telemetry have shown persistent normal sinus rhythm.    The patient recover from her stroke rather rapidly.  She has no significant motor or sensory deficits, except for a mild right upper visual field deficit that has been resolving rapidly during her admission here.  She was seen and evaluated by the neurologist, who recommend to continue Plavix and increase her statin as above.  Neurology feels no indication for dual antiplatelet therapy, therefore, aspirin was not indicated or prescribed.    Given the locations of her CVA lesions, high suspicious for alcohol cardiac arrhythmia is warranted.  A referral was placed to the cardiology lab for the patient to get fitted with a cardiac rhythm monitor for outpatient monitor for at least 1 month.  I have sent both a referral to the cardiac clinic as well as an order for the cardiac device to the outpatient device clinic.    Patient has a known history of an ascending aortic aneurysm.  She follows with Dr. Elton Edouard, vascular surgeon, in the outpatient setting.  During this admission, CT chest ROBERTS on admission found the ascending thoracic aorta to be dilated and measured 5.9 x 5.9 cm, compared to 4.2 x 4.2 cm previously.  These measurements also reflected on the echocardiogram, which reveals an ejection fractions of 70%, moderate concentric left ventricular hypertrophy,  grade 1 diastolic dysfunction, with grossly normal regional wall motion.  There is no other significant valvular pathology noted.  Patient will follow-up with Dr. Silva after discharge to discuss if surgical intervention is warranted at this point.    Given the rapid recovery, clearance from neurology, and the patient desire to go home even though skilled facility discharge will offer per recommendation of physical and occupational therapies, she is discharged in good and stable condition to home with close outpatient follow-up.    The patient met 2-midnight criteria for an inpatient stay at the time of discharge.    SPECIFIC OUTPATIENT FOLLOW-UP RECOMMENDATIONS  Patient need to follow-up with the cardiology clinic to be fitted with Bio-Tel device for outpatient cardiac arrhythmia monitor for at least 1 month.  Follow-up results of the cardiac event monitor.  If indicated, start appropriate medical therapy for any arrhythmia seen.    FOLLOW UP  Robert Arteaga M.D.  [address] [id number] [phone]    Schedule an appointment as soon as possible for a visit in 2 weeks  Hospital  left a voicemail with your providers office. If you do not hear back from them in 2 business days, please call the office and schedule a hospital follow up. Thank you!    [facility]  [address], [address]  [place] [address] [phone]  Schedule an appointment as soon as possible for a visit in 2 weeks      Elton Fung M.D.  [address] [id number] [phone]    In 2 weeks  aneurysm follow up      DIET  Resume home diet previous to admission    ACTIVITY  No strenuous excercise.   No heavy lifting.    PROCEDURES  No surgery found  No surgery found    PERTINENT RESULTS  Recent Labs     03/11/20  0950   WBC 7.7   RBC 3.80*   HEMOGLOBIN 11.8*   HEMATOCRIT 35.6*   MCV 93.7   MCH 31.1*   MCHC 33.1   RDW 13.2   PLATELETCT 166   MPV 10.1    and   Recent  Labs     03/11/20  0950 03/13/20  0808   SODIUM 138 137   POTASSIUM 4.3 4.2   CHLORIDE 104 107   CO2 22 22   GLUCOSE 133* 143*   BUN 37* 29*   CREATININE 2.1* 1.48*   CALCIUM 8.6 9.2     Cholesterol total 147, 2 glycerides 158, HDL 35, LDL 80.      CT chest angiogram:  FINDINGS:  Exam is limited due to respiratory motion artifact.     No CT evidence of thrombus within the central and main pulmonary arteries. The segmental arteries are not well evaluated due to motion     LUNGS: No large consolidation. Dependent atelectasis.     HEART: Mild to moderate cardiomegaly. Coronary artery calcifications     Small pericardial effusion     AORTA: Ascending thoracic aorta is dilated and measures 5.9 x 5.9 cm. Prior measurement was 4.2 x 4.2 cm     Aneurysmal dilatation of the aortic arch at 5.2 cm.  Prior measurement was 4.2 cm.     Descending thoracic aorta has normal caliber     Mural plaque is again present throughout the visualized aorta.     Atherosclerotic plaque at the takeoff of the great vessels     MEDIASTINUM: No pathologically enlarged adenopathy.     PLEURA: No effusions.     The upper abdomen is unremarkable     The bones are osteopenic.          Total time of the discharge process exceeds 45 minutes.      Wendi Boyd M.D.

## 2020-03-13 NOTE — PROGRESS NOTES
Pt AAox4, Dax. Denies pain, N/T, N/V. Up with CGA and assist. Voiding and eating. Loose BM this am. SBP in 160s, MD aware.

## 2020-03-13 NOTE — PROGRESS NOTES
Pt given DC instructions regarding meds, activities, follow up with PCP, stroke clinic, cardiology for holter monitor, home health, s/s of CVA. Pt verbalized understanding of instructions. Home Health not yet confirmed by CM. Pt and family not willing to wait for HH confirmation. Dr Oliver ROBERTS with DC prior to HH confirmation. Pt DC'd home with family members via  escort out at aprox [phone].

## 2020-03-13 NOTE — DISCHARGE PLANNING
Received Choice form at 1140  Agency/Facility Name: [facility]   Referral sent per [facility] form at 1146

## 2020-03-13 NOTE — FACE TO FACE
Face to Face Supporting Documentation - Home Health    The encounter with this patient was in whole or in part the primary reason for home health admission.    Date of encounter:   Patient:                    MRN:                       YOB: 2020  Darleen Verdugo  [id number]  5/30/1934     Home health to see patient for:  Physical Therapy evaluation and treatment and Occupational therapy evaluation and treatment    Skilled need for:  New Onset Medical Diagnosis acute stroke    Skilled nursing interventions to include:  Comment: None    Homebound status evidenced by:  Needs the assistance of another person in order to leave the home. Leaving home requires a considerable and taxing effort. There is a normal inability to leave the home.    Community Physician to provide follow up care: Robert Arteaga M.D.     Optional Interventions? No      I certify the face to face encounter for this home health care referral meets the CMS requirements and the encounter/clinical assessment with the patient was, in whole, or in part, for the medical condition(s) listed above, which is the primary reason for home health care. Based on my clinical findings: the service(s) are medically necessary, support the need for home health care, and the homebound criteria are met.  I certify that this patient has had a face to face encounter by myself.  Wendi Boyd M.D. - NPI: [id number]

## 2020-03-13 NOTE — CARE PLAN
Problem: Safety  Goal: Will remain free from injury  Outcome: PROGRESSING AS EXPECTED     Problem: Pain Management  Goal: Pain level will decrease to patient's comfort goal  Outcome: PROGRESSING AS EXPECTED   Bed alarm in place, no c/o pain

## 2020-03-13 NOTE — DISCHARGE INSTRUCTIONS
Dr. Boyd's discharge instructions:    DIET: Resume home diet previous to admission    ACTIVITY: Resume previous level of activities.    DIAGNOSIS: Stroke    Return to ER if fever greater than 38 degree Celsius or 100.4 degree Fahrenheit, worsening pain, chest pain at rest or associated with exertion, worsening shortness of breath, bloody coughs, bloody bowel movement, severe unrelenting abdominal pain, focal weakness.    Wendi Boyd M.D.       Ischemic Stroke  An ischemic stroke is the sudden death of brain tissue. Blood carries oxygen to all areas of the body. This type of stroke happens when your blood does not flow to your brain like normal. Your brain cannot get the oxygen it needs. This is an emergency. It must be treated right away.  Symptoms of a stroke usually happen all of a sudden. You may notice them when you wake up. They can include:  · Weakness or loss of feeling in your face, arm, or leg. This often happens on one side of the body.  · Trouble walking.  · Trouble moving your arms or legs.  · Loss of balance or coordination.  · Feeling confused.  · Trouble talking or understanding what people are saying.  · Slurred speech.  · Trouble seeing.  · Seeing two of one object (double vision).  · Feeling dizzy.  · Feeling sick to your stomach (nauseous) and throwing up (vomiting).  · A very bad headache for no reason.  Get help as soon as any of these problems start. This is important. Some treatments work better if they are given right away. These include:  · Aspirin.  · Medicines to control blood pressure.  · A shot (injection) of medicine to break up the blood clot.  · Treatments given in the blood vessel (artery) to take out the clot or break it up.  Other treatments may include:  · Oxygen.  · Fluids given through an IV tube.  · Medicines to thin out your blood.  · Procedures to help your blood flow better.  What increases the risk?  Certain things may make you more likely to have a stroke. Some of these  are things that you can change, such as:  · Being very overweight (obesity).  · Smoking.  · Taking birth control pills.  · Not being active.  · Drinking too much alcohol.  · Using drugs.  Other risk factors include:  · High blood pressure.  · High cholesterol.  · Diabetes.  · Heart disease.  · Being , , , or .  · Being over age 60.  · Family history of stroke.  · Having had blood clots, stroke, or warning stroke (transient ischemic attack, TIA) in the past.  · Sickle cell disease.  · Being a woman with a history of high blood pressure in pregnancy (preeclampsia).  · Migraine headache.  · Sleep apnea.  · Having an irregular heartbeat (atrial fibrillation).  · Long-term (chronic) diseases that cause soreness and swelling (inflammation).  · Disorders that affect how your blood clots.  Follow these instructions at home:  Medicines  · Take over-the-counter and prescription medicines only as told by your doctor.  · If you were told to take aspirin or another medicine to thin your blood, take it exactly as told by your doctor.  ¨ Taking too much of the medicine can cause bleeding.  ¨ If you do not take enough, it may not work as well.  · Know the side effects of your medicines. If you are taking a blood thinner, make sure you:  ¨ Hold pressure over any cuts for longer than usual.  ¨ Tell your dentist and other doctors that you take this medicine.  ¨ Avoid activities that may cause damage or injury to your body.  Eating and drinking  · Follow instructions from your doctor about what you cannot eat or drink.  · Eat healthy foods.  · If you have trouble with swallowing, do these things to avoid choking:  ¨ Take small bites when eating.  ¨ Eat foods that are soft or pureed.  Safety  · Follow instructions from your health care team about physical activity.  · Use a walker or cane as told by your doctor.  · Keep your home safe so you do not fall. This may include:  ¨ Having  experts look at your home to make sure it is safe.  ¨ Putting grab bars in the bedroom and bathroom.  ¨ Using raised toilets.  ¨ Putting a seat in the shower.  General instructions  · Do not use any tobacco products.  ¨ Examples of these are cigarettes, chewing tobacco, and e-cigarettes.  ¨ If you need help quitting, ask your doctor.  · Limit how much alcohol you drink. This means no more than 1 drink a day for nonpregnant women and 2 drinks a day for men. One drink equals 12 oz of beer, 5 oz of wine, or 1½ oz of hard liquor.  · If you need help to stop using drugs or alcohol, ask your doctor to refer you to a program or specialist.  · Stay active. Exercise as told by your doctor.  · Keep all follow-up visits as told by your doctor. This is important.  Get help right away if:  · You suddenly:  ¨ Have weakness or loss of feeling in your face, arm, or leg.  ¨ Feel confused.  ¨ Have trouble talking or understanding what people are saying.  ¨ Have trouble seeing.  ¨ Have trouble walking.  ¨ Have trouble moving your arms or legs.  ¨ Feel dizzy.  ¨ Lose your balance or coordination.  ¨ Have a very bad headache and you do not know why.  · You pass out (lose consciousness) or almost pass out.  · You have jerky movements that you cannot control (seizure).  These symptoms may be an emergency. Do not wait to see if the symptoms will go away. Get medical help right away. Call your local emergency services (911 in the [place] Do not drive yourself to the hospital.   This information is not intended to replace advice given to you by your health care provider. Make sure you discuss any questions you have with your health care provider.  Document Released: 12/06/2012 Document Revised: 05/30/2017 Document Reviewed: 03/15/2017  [employer] Interactive Patient Education © 2017 [employer]    Discharge Instructions    Discharged to home by car with relative. Discharged via wheelchair, hospital escort: Yes.  Special equipment needed: Not  "Applicable    Be sure to schedule a follow-up appointment with your primary care doctor or any specialists as instructed.     Discharge Plan:   Diet Plan: Discussed  Activity Level: Discussed  Confirmed Follow up Appointment: Patient to Call and Schedule Appointment  Confirmed Symptoms Management: Discussed  Medication Reconciliation Updated: Yes  Influenza Vaccine Indication: Not indicated: Previously immunized this influenza season and > 8 years of age    I understand that a diet low in cholesterol, fat, and sodium is recommended for good health. Unless I have been given specific instructions below for another diet, I accept this instruction as my diet prescription.   Other diet: diabetic    Special Instructions:     Stroke/CVA/TIA/Hemorrhagic Ischemia Discharge Instructions  You have had a stroke. Your risk factors have been identified as follows:  Age - Over 55  High blood pressure  Diabetes  Carotid Stenosis   Previous TIAs or \"mini strokes\"  High Cholesterol and lipids  Suspected paroxsymal atrial fibrillation- recommend outpatient heart monitoring device  It is important that you reduce your risk factors to avoid another stroke in the future. Here are some general guidelines to follow:  · Eat healthy - avoid food high in fat.  · Get regular exercise.  · Maintain a healthy weight.  · Avoid smoking.  · Avoid alcohol and illegal drug use.  · Take your medications as directed.  For more information regarding risk factors, refer to pages 17-19 in your Stroke Patient Education Guide. Stroke Education Guide was given to patient.    Warning signs of a stroke include (which can also be found on page 3 of your Stroke Patient Education Guide):  · Sudden numbness of weakness of the face, arm or leg (especially on one side of the body).  · Sudden confusion, trouble speaking or understanding.  · Sudden trouble seeing in one or both eyes.  · Sudden trouble walking, dizziness, loss of balance or coordination.  · Sudden " severe headache with no known cause.  It is very important to get treatment quickly when a stroke occurs. If you experience any of the above warning signs, call [phone] immediately.     Some patients who have had a stroke will be going home on a blood thinner medication called Warfarin (Coumadin).  This medication requires very close monitoring and follow up.  This follow up can be provided by either your Primary Care Physician or by [facility].  The Outpatient Anticoagulation Service is located at the [facility] at [facility] ([facility]).  If you do not know when your follow up appointment is scheduled, call [phone] to verify your appointment time.      · Is patient discharged on Warfarin / Coumadin?   No     Depression / Suicide Risk    As you are discharged from this [facility], it is important to learn how to keep safe from harming yourself.    Recognize the warning signs:  · Abrupt changes in personality, positive or negative- including increase in energy   · Giving away possessions  · Change in eating patterns- significant weight changes-  positive or negative  · Change in sleeping patterns- unable to sleep or sleeping all the time   · Unwillingness or inability to communicate  · Depression  · Unusual sadness, discouragement and loneliness  · Talk of wanting to die  · Neglect of personal appearance   · Rebelliousness- reckless behavior  · Withdrawal from people/activities they love  · Confusion- inability to concentrate     If you or a loved one observes any of these behaviors or has concerns about self-harm, here's what you can do:  · Talk about it- your feelings and reasons for harming yourself  · Remove any means that you might use to hurt yourself (examples: pills, rope, extension cords, firearm)  · Get professional help from the community (Mental Health, Substance Abuse, psychological counseling)  · Do  not be alone:Call your Safe Contact- someone whom you trust who will be there for you.  · Call your local CRISIS HOTLINE 802-8122 or 381-176-7462  · Call your local Children's Mobile Crisis Response Team [place] (800) 265-4832 or www.[employer]  · Call the toll free National Suicide Prevention Hotlines   · National Suicide Prevention Lifeline 758-768-VMAP (8374)  · National [employer] Network 800-SUICIDE (778-5767)

## 2020-03-13 NOTE — PROGRESS NOTES
Neurology Progress Note  Neurohospitalist Service, [facility]    Referring Physician: Wendi Boyd M.D.    Chief Complaint   Patient presents with   • Possible Stroke     Seen at OSH, + CVA on MRI, improving S/Sx       HPI: Refer to initial documented Neurology H&P, as detailed in the patient's chart.    Interval History 3/13/2020: Branden remains admitted to the neuroscience unit from the ER after transfer from outside hospital presenting 3/11/20 for altered mental status and neurology consulted for further evaluation stroke.  Currently has no medical complaints. She denies confusion, headache, vision changes, numbness or tingling, loss of consciousness, or abnormal movements. She states having an appetite, but not liking the food offered to her at this time.      Past Medical History:   Past Medical History:   Diagnosis Date   • Aortic aneurysm, thoracic ([facility]) 10/15/2012   • Arthritis     osteoarthritis   • Carotid stenosis, right 10/15/2012   • CATARACT     bilateral   • COPD (chronic obstructive pulmonary disease) ([facility]) 10/15/2012   • Diabetes     type 2   • Diverticulosis 10/15/2012   • Eczema    • Essential tremor 10/15/2012   • GERD (gastroesophageal reflux disease)    • Glaucoma    • H/O hyperkalemia 10/15/2012   • Hyperlipidemia    • Hypertension    • Nocturnal hypoxia 10/15/2012   • Rosacea    • Stroke ([facility])    • Vertigo         FHx:  Family History   Problem Relation Age of Onset   • Hypertension Mother    • Cancer Mother         lymphoma   • Cancer Father         lung   • Cancer Sister    • Heart Disease Son         SHx:  Social History     Socioeconomic History   • Marital status:      Spouse name: Not on file   • Number of children: Not on file   • Years of education: Not on file   • Highest education level: Not on file   Occupational History   • Not on file   Social Needs   • Financial resource strain: Not on file   • Food insecurity     Worry: Not on file      Inability: Not on file   • Transportation needs     Medical: Not on file     Non-medical: Not on file   Tobacco Use   • Smoking status: Former Smoker     Packs/day: 0.75     Years: 50.00     Pack years: 37.50     Types: Cigarettes     Last attempt to quit: 10/20/2003     Years since quittin.4   • Smokeless tobacco: Never Used   Substance and Sexual Activity   • Alcohol use: Yes     Comment: rarely   • Drug use: No   • Sexual activity: Not on file   Lifestyle   • Physical activity     Days per week: Not on file     Minutes per session: Not on file   • Stress: Not on file   Relationships   • Social connections     Talks on phone: Not on file     Gets together: Not on file     Attends Taoism service: Not on file     Active member of club or organization: Not on file     Attends meetings of clubs or organizations: Not on file     Relationship status: Not on file   • Intimate partner violence     Fear of current or ex partner: Not on file     Emotionally abused: Not on file     Physically abused: Not on file     Forced sexual activity: Not on file   Other Topics Concern   • Not on file   Social History Narrative   • Not on file        Medications:    Current Facility-Administered Medications:   •  atorvastatin (LIPITOR) tablet 80 mg, 80 mg, Oral, QAM, Liseth Niño M.D., 80 mg at 20  •  clopidogrel (PLAVIX) tablet 75 mg, 75 mg, Oral, QAMLiseth M.D., 75 mg at 20 045  •  omeprazole (PRILOSEC) capsule 20 mg, 20 mg, Oral, DAILY, Liseth Niño M.D., 20 mg at 20  •  acetaminophen (TYLENOL) tablet 650 mg, 650 mg, Oral, Q6HRS PRN, Liseth Niño M.D.  •  ondansetron (ZOFRAN) syringe/vial injection 4 mg, 4 mg, Intravenous, Q4HRS PRN, Liseth Niño M.D.  •  ondansetron (ZOFRAN ODT) dispertab 4 mg, 4 mg, Oral, Q4HRS PRN, Liseth Niño M.D.  •  senna-docusate (PERICOLACE or SENOKOT S) 8.6-50 MG per tablet 2 Tab, 2 Tab, Oral, BID, 2 Tab at 20 **AND** polyethylene  glycol/lytes (MIRALAX) PACKET 1 Packet, 1 Packet, Oral, QDAY PRN **AND** magnesium hydroxide (MILK OF MAGNESIA) suspension 30 mL, 30 mL, Oral, QDAY PRN **AND** bisacodyl (DULCOLAX) suppository 10 mg, 10 mg, Rectal, QDAY PRN, Liseth Niño M.D.  •  Pharmacy consult request - Allow for permissive hypertension: SBP up to 220 mmHg/DBP up to 120 mmHg x 48 hours, , Other, PHARMACY TO DOSE, Liseth Niño M.D.  •  Respiratory Therapy Consult, , Nebulization, Continuous RT, Liseth Niño M.D.  •  Notify provider if pain remains uncontrolled, , , CONTINUOUS **AND** Use the numeric rating scale (NRS-11) on regular floors and Critical-Care Pain Observation Tool (CPOT) on ICUs/Trauma to assess pain, , , CONTINUOUS **AND** Pulse Ox (Oximetry), , , CONTINUOUS **AND** Pharmacy Consult Request ...Pain Management Review 1 Each, 1 Each, Other, PHARMACY TO DOSE **AND** If patient difficult to arouse and/or has respiratory depression, stop any opiates that are currently infusing and call a Rapid Response., , , CONTINUOUS **AND** oxyCODONE immediate-release (ROXICODONE) tablet 2.5 mg, 2.5 mg, Oral, Q3HRS PRN **AND** oxyCODONE immediate-release (ROXICODONE) tablet 5 mg, 5 mg, Oral, Q3HRS PRN **AND** morphine (pf) 4 MG/ML injection 2 mg, 2 mg, Intravenous, Q3HRS PRN, Liseth Niño M.D.  •  labetalol (NORMODYNE/TRANDATE) injection 10 mg, 10 mg, Intravenous, Q4HRS PRN **OR** hydrALAZINE (APRESOLINE) injection 10 mg, 10 mg, Intravenous, Q2HRS PRN, Liseth Niño M.D.    Allergies:  No Known Allergies     Review of systems:   Constitutional: denies fever.   Eyes: See HPI and interval history  Cardiovascular: denies new onset of chest pain, palpitations, syncope, or dyspnea of exertion.  Pulmonary: denies shortness of breath, new onset of cough, wheezing, chest pain or flu-like symptoms.   GI: See HPI and interval history; denies nausea, vomiting, abdominal pain, and change in bowel habits.  : denies dysuria, urinary incontinence,  hematuria.  Musculoskeletal:denies joint swelling or pain, muscle pain, neck and back pain.   Neurologic: See HPI and interval history; denies facial droopiness, ataxia, change in speech or language, memory loss, or seizures.   Psychiatric: denies symptoms of depression, anxiety, hallucinations, mood swings or changes, suicidal or homicidal thoughts.     Physical Examination:   Vitals:    03/12/20 2000 03/13/20 0000 03/13/20 0400 03/13/20 0750   BP: 136/73 (!) 164/77 (!) 174/75 (!) 168/67   Pulse: 78 85 79 77   Resp: 18 16 18 20   Temp: 36.3 °C (97.4 °F) 36.1 °C (97 °F) 36.7 °C (98.1 °F) 36.7 °C (98 °F)   TempSrc: Temporal Temporal Temporal Temporal   SpO2: 96% 95% 97% 93%   Weight:       Height:         General: Patient in no acute distress, pleasant and cooperative.  HEENT: Normocephalic, no signs of acute trauma.   Neck: supple, no meningeal signs or carotid bruits. There is normal range of motion. No tenderness on exam.   Chest: clear to auscultation. No cough.   CV: RRR, no murmurs.   Skin: no signs of acute rashes or trauma.   Musculoskeletal: joints exhibit full range of motion, without any pain to palpation. There are no signs of joint or muscle swelling. There is no tenderness to deep palpation of muscles.   Psychiatric: No hallucinatory behavior. Denies symptoms of depression or suicidal ideation. Mood and affect appear normal on exam.     NEUROLOGICAL EXAM:   Mental status, orientation: Awake, alert and fully oriented.   Speech and language: speech is clear and fluent. The patient is able to name, repeat and comprehend.   Memory: There is intact recollection of recent and remote events.   Cranial nerve exam: Pupils are 3-4 mm bilaterally and equally reactive to light and accommodation. Visual fields reveal left complete hemianopia by confrontation. There is no nystagmus on primary or secondary gaze. Intact full EOM in all directions of gaze. Face appears symmetric. Sensation in the face is intact to light  touch. Uvula is midline. Palate elevates symmetrically. Tongue is midline and without any signs of tongue biting or fasciculations. Sternocleidomastoid muscles exhibit is normal strength bilaterally. Shoulder shrug is intact bilaterally.   Motor exam: Strength is 3+/5 in RUE, and 4/5 all extremities. Tone is normal. No abnormal movements were seen on exam.   Sensory exam reveals normal sense of light touch in all extremities.   Deep tendon reflexes:  2+ bicep and brachioradialis, 1+ patellar and Achilles. Plantar responses are flexor. There is no clonus.   Coordination: Mild to moderate ataxia with shows a normal finger-nose-finger and heel-down-shin tests. Normal rapidly alternating movements.   Gait: Deferred as patient is high fall risk.      Ancillary Data Reviewed:    Labs:  Lab Results   Component Value Date/Time    PROTHROMBTM 14.3 03/11/2020 04:05 PM    INR 1.09 03/11/2020 04:05 PM      Lab Results   Component Value Date/Time    WBC 7.7 03/11/2020 09:50 AM    RBC 3.80 (L) 03/11/2020 09:50 AM    HEMOGLOBIN 11.8 (L) 03/11/2020 09:50 AM    HEMATOCRIT 35.6 (L) 03/11/2020 09:50 AM    MCV 93.7 03/11/2020 09:50 AM    MCH 31.1 (H) 03/11/2020 09:50 AM    MCHC 33.1 03/11/2020 09:50 AM    MPV 10.1 03/11/2020 09:50 AM      Lab Results   Component Value Date/Time    SODIUM 137 03/13/2020 08:08 AM    POTASSIUM 4.2 03/13/2020 08:08 AM    CHLORIDE 107 03/13/2020 08:08 AM    CO2 22 03/13/2020 08:08 AM    GLUCOSE 143 (H) 03/13/2020 08:08 AM    BUN 29 (H) 03/13/2020 08:08 AM    CREATININE 1.48 (H) 03/13/2020 08:08 AM      Lab Results   Component Value Date/Time    CHOLSTRLTOT 147 03/12/2020 06:33 AM    LDL 80 03/12/2020 06:33 AM    HDL 35 (A) 03/12/2020 06:33 AM    TRIGLYCERIDE 158 (H) 03/12/2020 06:33 AM       Lab Results   Component Value Date/Time    ALKPHOSPHAT 71 03/11/2020 09:50 AM    ASTSGOT 15 03/11/2020 09:50 AM    ALTSGPT 12 03/11/2020 09:50 AM    TBILIRUBIN 0.6 03/11/2020 09:50 AM        Imaging/Testing:    I  "interpreted and/or reviewed the patient's neuroimaging    EC-ECHOCARDIOGRAM COMPLETE W/O CONT   Final Result   CONCLUSIONS  Severely dilated ascending aorta at 5.7cm, consider CT surgery   consultation if clinically indicated for repair.   Normal left ventricular systolic function.  Left ventricular ejection fraction is visually estimated to be 70%.  Moderate concentric left ventricular hypertrophy.  Small left ventricular size.  Grade I diastolic dysfunction.  Grossly normal regional wall motion.  The right ventricle was normal in size and function.  Mildly dilated left atrium.  Mild aortic stenosis.  Mild aortic insufficiency.  Estimated right ventricular systolic pressure is 20 mmHg, normal, but   may be underestimated due to trace TR regurgitation.     No prior study is available for comparison.    OUTSIDE IMAGES-CT HEAD   Final Result      OUTSIDE IMAGES-DX CHEST   Final Result      OUTSIDE IMAGES-CT CHEST   Final Result      OUTSIDE IMAGES-MR BRAIN   Final Result          Assessment and Plan:    Darleen Verdugo is a 85 y.o. female with relevant history of thoracic aortic aneurysm, arthritis, right carotid stenosis, cataract, COPD, diabetes, essential tremor, glaucoma, hyperlipidemia, hypertension, nocturnal hypoxia, RANDOLPH, stroke (11 years ago per patient), and vertigo presenting for altered mental status whom neurology was consulted to address further workup of stroke.  Per the patient, she \"just couldn't wake up\" yesterday morning at approximately 8:00 when her significant other tried to wake her for which he notified EMS. Patient has no recollection of the events leading to her hospitalization, so majority of HPI comes from hospital chart.  Per hospital chart, patient's significant other noted that when she went to bed last night she appeared normal. En route to the outside facility, patient became more aware, was A&O x3, but drowsy upon arrival to ER. EMS reports patient's blood glucose 170, /90 en " route. Patient is was being treated for UTI and was taking Bactrim, but given Rocephin 2 g at outside facility. UDS returned positive for benzodiazepines, but patient has no history of taking these medications.  MRI brain without contrast at outside facility revealed multifactorial acute infarcts suggestive of embolic etiology.  [facility] was then [facility] as outside facility has no neurologist, and [facility] Neurology was consulted.     Impression: Multifocal acute infarcts possibly secondary to embolic phenomenon versus watershed infarcts.    Plan:  -Brain MRI at outside hospital revealed multifocal acute infarcts as well as moderate to severe chronic small vessel ischemic changes with chronic left occipital lobe and left basal ganglia infarcts.  Imaging results consistent with assessment findings of left complete hemianopsia, ataxia, and right upper extremity weakness.  -q4h and PRN neuro assessment. VS per nursing/unit protocol. BP goal now < 140/90. Antihypertensives per primary team.   -Telemetry and pulse oximetry; currently SR. Screen for Afib/arrhythmia.   -Echo revealed severely dilated ascending aorta, EF 70%, left ventricular hypertrophy, grade 1 diastolic dysfunction, mild aortic stenosis, mild aortic insufficiency.  Does not appear bubble study was completed.  -Recommend extended cardiac event monitoring for A. Fib (Zio pach or Loop recorder).  -Continue clopidogrel 75 mg PO q day and Atorvastatin 80 mg PO q HS. Note lipid panel: triglycerides 158, HDL 35, LDL 80.   -ASA not recommended as there is no evidence to support increased antiplatelet efficacy with this patient's clinical presentation.   -Recommend aggressive BG management per primary team. Avoid IVF with Dextrose. BG goal 140-180. Note hemoglobin A1c is 8.3.   -PT/OT/SLP eval and treat.   -Counseled patient at length regarding life style and risk factor modification for  secondary stroke prevention.   -All other medical management per primary team.   -DVT PPX: SCDs.   -Follow-up appointment outpatient with [facility].     The evaluation of the patient, and recommended management, was discussed with Dr Ross, Dr. Boyd, and bedside RN. I have performed a physical exam and reviewed and updated ROS and Plan today (3/13/2020). In review of yesterday's note (3/12/2020), there are no changes except as documented above.  Neurology will sign off at this time. Please reconsult if any change in patient's neurological condition, concerns, or questions.    JUANCARLOS Sequeira.   Nurse Practitioner, Neurohospitalist  [facility]  t) [phone] (f) [phone]

## 2020-03-13 NOTE — CARE PLAN
Problem: Communication  Goal: The ability to communicate needs accurately and effectively will improve  Outcome: PROGRESSING AS EXPECTED  Note: Pt hard of hearing     Problem: Safety  Goal: Will remain free from injury  Outcome: PROGRESSING AS EXPECTED  Note: Fall precautions in place

## 2020-03-14 LAB
CK BB CFR SERPL ELPH: 0 % (ref 0–0)
CK MACRO1 CFR SERPL: 0 % (ref 0–0)
CK MACRO2 CFR SERPL: 0 % (ref 0–0)
CK MB CFR SERPL ELPH: 0 % (ref 0–4)
CK MM CFR SERPL ELPH: 100 % (ref 96–100)
CK SERPL-CCNC: 47 U/L (ref 20–180)

## 2020-03-15 LAB — VIT B1 BLD-MCNC: 85 NMOL/L (ref 70–180)

## 2020-03-16 NOTE — ED NOTES
Chart reviewed. Blood cultures from 3-11 still pending neg so far.she has been disch from [facility]. Her urine cult grew <13916 mixed yanet. However she had been on bactrim when urine collected for a uti. I spoke with daughter who states she had been adv to finish bactrim. Dr lai agrees with this plan adv ret er if any concerns. Daughter is making f/u appts